# Patient Record
Sex: FEMALE | Race: BLACK OR AFRICAN AMERICAN | Employment: FULL TIME | URBAN - METROPOLITAN AREA
[De-identification: names, ages, dates, MRNs, and addresses within clinical notes are randomized per-mention and may not be internally consistent; named-entity substitution may affect disease eponyms.]

---

## 2019-03-02 ENCOUNTER — ED HISTORICAL/CONVERTED ENCOUNTER (OUTPATIENT)
Dept: OTHER | Age: 44
End: 2019-03-02

## 2019-08-02 ENCOUNTER — HOSPITAL ENCOUNTER (OUTPATIENT)
Dept: NON INVASIVE DIAGNOSTICS | Age: 44
Discharge: HOME | End: 2019-08-02
Attending: ANESTHESIOLOGY
Payer: COMMERCIAL

## 2019-08-02 ENCOUNTER — HOSPITAL ENCOUNTER (OUTPATIENT)
Dept: PREADMISSION TESTING | Age: 44
Discharge: HOME | End: 2019-08-02
Payer: COMMERCIAL

## 2019-08-02 VITALS
SYSTOLIC BLOOD PRESSURE: 135 MMHG | HEART RATE: 80 BPM | WEIGHT: 242.5 LBS | RESPIRATION RATE: 16 BRPM | DIASTOLIC BLOOD PRESSURE: 84 MMHG | OXYGEN SATURATION: 98 % | HEIGHT: 64 IN | BODY MASS INDEX: 41.4 KG/M2 | TEMPERATURE: 98.8 F

## 2019-08-02 LAB
ABO + RH BLD: NORMAL
ANION GAP SERPL CALC-SCNC: 4 MMOL/L (ref 5–15)
BASOPHILS # BLD: 0.1 K/UL (ref 0–0.1)
BASOPHILS NFR BLD: 1 % (ref 0–1)
BLOOD GROUP ANTIBODIES SERPL: NORMAL
BUN SERPL-MCNC: 12 MG/DL (ref 6–20)
BUN/CREAT SERPL: 15 (ref 12–20)
CALCIUM SERPL-MCNC: 9 MG/DL (ref 8.5–10.1)
CHLORIDE SERPL-SCNC: 105 MMOL/L (ref 97–108)
CO2 SERPL-SCNC: 30 MMOL/L (ref 21–32)
CREAT SERPL-MCNC: 0.82 MG/DL (ref 0.55–1.02)
DIFFERENTIAL METHOD BLD: ABNORMAL
EOSINOPHIL # BLD: 0.1 K/UL (ref 0–0.4)
EOSINOPHIL NFR BLD: 1 % (ref 0–7)
ERYTHROCYTE [DISTWIDTH] IN BLOOD BY AUTOMATED COUNT: 23.3 % (ref 11.5–14.5)
GLUCOSE SERPL-MCNC: 87 MG/DL (ref 65–100)
HCT VFR BLD AUTO: 32.7 % (ref 35–47)
HGB BLD-MCNC: 9.8 G/DL (ref 11.5–16)
IMM GRANULOCYTES # BLD AUTO: 0 K/UL (ref 0–0.04)
IMM GRANULOCYTES NFR BLD AUTO: 0 % (ref 0–0.5)
LYMPHOCYTES # BLD: 2.4 K/UL (ref 0.8–3.5)
LYMPHOCYTES NFR BLD: 36 % (ref 12–49)
MCH RBC QN AUTO: 26.8 PG (ref 26–34)
MCHC RBC AUTO-ENTMCNC: 30 G/DL (ref 30–36.5)
MCV RBC AUTO: 89.3 FL (ref 80–99)
MONOCYTES # BLD: 0.4 K/UL (ref 0–1)
MONOCYTES NFR BLD: 6 % (ref 5–13)
NEUTS SEG # BLD: 3.8 K/UL (ref 1.8–8)
NEUTS SEG NFR BLD: 56 % (ref 32–75)
NRBC # BLD: 0 K/UL (ref 0–0.01)
NRBC BLD-RTO: 0 PER 100 WBC
PLATELET # BLD AUTO: 311 K/UL (ref 150–400)
PMV BLD AUTO: 10.3 FL (ref 8.9–12.9)
POTASSIUM SERPL-SCNC: 3.9 MMOL/L (ref 3.5–5.1)
RBC # BLD AUTO: 3.66 M/UL (ref 3.8–5.2)
RBC MORPH BLD: ABNORMAL
SODIUM SERPL-SCNC: 139 MMOL/L (ref 136–145)
SPECIMEN EXP DATE BLD: NORMAL
WBC # BLD AUTO: 6.8 K/UL (ref 3.6–11)

## 2019-08-02 PROCEDURE — 36415 COLL VENOUS BLD VENIPUNCTURE: CPT

## 2019-08-02 PROCEDURE — 85025 COMPLETE CBC W/AUTO DIFF WBC: CPT

## 2019-08-02 PROCEDURE — 80048 BASIC METABOLIC PNL TOTAL CA: CPT

## 2019-08-02 PROCEDURE — 93005 ELECTROCARDIOGRAM TRACING: CPT

## 2019-08-02 PROCEDURE — 86900 BLOOD TYPING SEROLOGIC ABO: CPT

## 2019-08-02 RX ORDER — PAROXETINE HYDROCHLORIDE 20 MG/1
10 TABLET, FILM COATED ORAL DAILY
Status: ON HOLD | COMMUNITY
End: 2019-09-01

## 2019-08-02 RX ORDER — VALACYCLOVIR HYDROCHLORIDE 1 G/1
1000 TABLET, FILM COATED ORAL DAILY
Status: ON HOLD | COMMUNITY
End: 2019-09-01

## 2019-08-02 RX ORDER — ATORVASTATIN CALCIUM 10 MG/1
10 TABLET, FILM COATED ORAL DAILY
COMMUNITY
End: 2020-09-29

## 2019-08-02 RX ORDER — LISINOPRIL AND HYDROCHLOROTHIAZIDE 10; 12.5 MG/1; MG/1
1 TABLET ORAL DAILY
COMMUNITY
End: 2020-09-29

## 2019-08-02 NOTE — PERIOP NOTES
[address] [address] [facility]   MAIN OR                                  [phone]   MAIN PRE OP                          [phone]                                                                                AMBULATORY PRE OP          [phone]  PRE-ADMISSION TESTING    [phone]     Surgery Date:   Friday, August 23, 2019        Is surgery arrival time given by surgeon? NO  If NO, [place] staff will call you between 3 and 7pm the day before your surgery with your arrival time. (If your surgery is on a Monday, we will call you the Friday before.)    Call [phone] after 7pm Monday-Friday if you did not receive your arrival time. INSTRUCTIONS BEFORE YOUR SURGERY   When You  Arrive Arrive at the [address] [facility] on the day of your surgery  Have your insurance card, photo ID, and any copayment (if needed)   Food   and   Drink NO food or drink after midnight the night before surgery    This means NO water, gum, mints, coffee, juice, etc.  No alcohol (beer, wine, liquor) 24 hours before and after surgery   Medications to   TAKE   Morning of Surgery MEDICATIONS TO TAKE THE MORNING OF SURGERY WITH A SIP OF WATER:    Paxil   Medications  To  STOP      7 days before surgery  Non-Steroidal anti-inflammatory Drugs (NSAID's): for example, Ibuprofen (Advil, Motrin), Naproxen (Aleve)   Aspirin, if taking for pain    Herbal supplements, vitamins, and fish oil   Other:  (Pain medications not listed above, including Tylenol may be taken)   Blood  Thinners  If you take  Aspirin, Plavix, Coumadin, or any blood-thinning or anti-blood clot medicine, talk to the doctor who prescribed the medications for pre-operative instructions.    Bathing Clothing  Jewelry  Valuables       If you shower the morning of surgery, please do not apply anything to your skin (lotions, powders, deodorant, or makeup, especially mascara)   Follow all special bath instructions (for total joint replacement, spine and bowel surgeries)   Do not shave or trim anywhere 24 hours before surgery   Wear your hair loose or down; no pony-tails, buns, or metal hair clips   Wear loose, comfortable, clean clothes   Wear glasses instead of contacts   Leave money, valuables, and jewelry, including body piercings, at home   Going Home - or Spending the Night  SAME-DAY SURGERY: You must have a responsible adult drive you home and stay with you 24 hours after surgery   ADMITS: If your doctor is keeping you in the hospital after surgery, leave personal belongings/luggage in your car until you have a hospital room number. Hospital discharge time is 12 noon  Drivers must be here before 12 noon unless you are told differently   Special Instructions Please bring completed medication sheet and CPAP machine the day of surgery. Free  parking available from 7am until 5pm.     Follow all instructions so your surgery wont be cancelled. Please, be on time. If a situation occurs and you are delayed the day of surgery, call [phone]. If your physical condition changes (like a fever, cold, flu, etc.) call your surgeon. The patient was contacted  in person. Home medication reviewed and verified during PAT appointment. The patient verbalizes understanding of all instructions and does not  need reinforcement.

## 2019-08-03 LAB
ATRIAL RATE: 72 BPM
BACTERIA SPEC CULT: ABNORMAL
BACTERIA SPEC CULT: ABNORMAL
CALCULATED P AXIS, ECG09: 31 DEGREES
CALCULATED R AXIS, ECG10: 19 DEGREES
CALCULATED T AXIS, ECG11: 30 DEGREES
DIAGNOSIS, 93000: NORMAL
P-R INTERVAL, ECG05: 172 MS
Q-T INTERVAL, ECG07: 402 MS
QRS DURATION, ECG06: 86 MS
QTC CALCULATION (BEZET), ECG08: 440 MS
SERVICE CMNT-IMP: ABNORMAL
VENTRICULAR RATE, ECG03: 72 BPM

## 2019-08-20 NOTE — PERIOP NOTES
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 Order for contact isolation placed in [facility] under sign and held, multi-phase orders for the day of surgery.   DOS: 8/23/2019

## 2019-08-22 ENCOUNTER — ANESTHESIA EVENT (OUTPATIENT)
Dept: SURGERY | Age: 44
DRG: 743 | End: 2019-08-22
Payer: COMMERCIAL

## 2019-08-23 ENCOUNTER — HOSPITAL ENCOUNTER (INPATIENT)
Age: 44
LOS: 2 days | Discharge: HOME | DRG: 743 | End: 2019-08-25
Attending: OBSTETRICS & GYNECOLOGY | Admitting: OBSTETRICS & GYNECOLOGY
Payer: COMMERCIAL

## 2019-08-23 ENCOUNTER — ANESTHESIA (OUTPATIENT)
Dept: SURGERY | Age: 44
DRG: 743 | End: 2019-08-23
Payer: COMMERCIAL

## 2019-08-23 ENCOUNTER — APPOINTMENT (OUTPATIENT)
Dept: GENERAL RADIOLOGY | Age: 44
DRG: 743 | End: 2019-08-23
Attending: OBSTETRICS & GYNECOLOGY
Payer: COMMERCIAL

## 2019-08-23 DIAGNOSIS — G89.18 POSTOPERATIVE PAIN: Primary | ICD-10-CM

## 2019-08-23 PROBLEM — N93.9 ABNORMAL UTERINE BLEEDING: Status: ACTIVE | Noted: 2019-08-23

## 2019-08-23 LAB
ABO + RH BLD: NORMAL
BLOOD GROUP ANTIBODIES SERPL: NORMAL
HCG UR QL: NEGATIVE
SPECIMEN EXP DATE BLD: NORMAL

## 2019-08-23 PROCEDURE — 77030031139 HC SUT VCRL2 J&J -A: Performed by: OBSTETRICS & GYNECOLOGY

## 2019-08-23 PROCEDURE — 74011250636 HC RX REV CODE- 250/636

## 2019-08-23 PROCEDURE — 77030026438 HC STYL ET INTUB CARD -A: Performed by: NURSE ANESTHETIST, CERTIFIED REGISTERED

## 2019-08-23 PROCEDURE — 77030008684 HC TU ET CUF COVD -B: Performed by: NURSE ANESTHETIST, CERTIFIED REGISTERED

## 2019-08-23 PROCEDURE — 77030002933 HC SUT MCRYL J&J -A: Performed by: OBSTETRICS & GYNECOLOGY

## 2019-08-23 PROCEDURE — 74011250636 HC RX REV CODE- 250/636: Performed by: OBSTETRICS & GYNECOLOGY

## 2019-08-23 PROCEDURE — 74011250637 HC RX REV CODE- 250/637: Performed by: NURSE ANESTHETIST, CERTIFIED REGISTERED

## 2019-08-23 PROCEDURE — 77030027744 HC PWDR HEMSTAT ARISTA ABSRB 5GM BARD -D: Performed by: OBSTETRICS & GYNECOLOGY

## 2019-08-23 PROCEDURE — 74011250636 HC RX REV CODE- 250/636: Performed by: ANESTHESIOLOGY

## 2019-08-23 PROCEDURE — 76210000000 HC OR PH I REC 2 TO 2.5 HR: Performed by: OBSTETRICS & GYNECOLOGY

## 2019-08-23 PROCEDURE — 77030013079 HC BLNKT BAIR HGGR 3M -A: Performed by: NURSE ANESTHETIST, CERTIFIED REGISTERED

## 2019-08-23 PROCEDURE — 74011000250 HC RX REV CODE- 250: Performed by: NURSE ANESTHETIST, CERTIFIED REGISTERED

## 2019-08-23 PROCEDURE — 74011250636 HC RX REV CODE- 250/636: Performed by: NURSE ANESTHETIST, CERTIFIED REGISTERED

## 2019-08-23 PROCEDURE — 0UT70ZZ RESECTION OF BILATERAL FALLOPIAN TUBES, OPEN APPROACH: ICD-10-PCS | Performed by: OBSTETRICS & GYNECOLOGY

## 2019-08-23 PROCEDURE — 77030002966 HC SUT PDS J&J -A: Performed by: OBSTETRICS & GYNECOLOGY

## 2019-08-23 PROCEDURE — 74011250637 HC RX REV CODE- 250/637: Performed by: OBSTETRICS & GYNECOLOGY

## 2019-08-23 PROCEDURE — 77030002974 HC SUT PLN J&J -A: Performed by: OBSTETRICS & GYNECOLOGY

## 2019-08-23 PROCEDURE — 77030002888 HC SUT CHRMC J&J -A: Performed by: OBSTETRICS & GYNECOLOGY

## 2019-08-23 PROCEDURE — 77030003029 HC SUT VCRL J&J -B: Performed by: OBSTETRICS & GYNECOLOGY

## 2019-08-23 PROCEDURE — 0UT90ZZ RESECTION OF UTERUS, OPEN APPROACH: ICD-10-PCS | Performed by: OBSTETRICS & GYNECOLOGY

## 2019-08-23 PROCEDURE — 77030034850: Performed by: OBSTETRICS & GYNECOLOGY

## 2019-08-23 PROCEDURE — 77030040361 HC SLV COMPR DVT MDII -B

## 2019-08-23 PROCEDURE — 76010000134 HC OR TIME 3.5 TO 4 HR: Performed by: OBSTETRICS & GYNECOLOGY

## 2019-08-23 PROCEDURE — 74018 RADEX ABDOMEN 1 VIEW: CPT

## 2019-08-23 PROCEDURE — 86900 BLOOD TYPING SEROLOGIC ABO: CPT

## 2019-08-23 PROCEDURE — 77030018846 HC SOL IRR STRL H20 ICUM -A

## 2019-08-23 PROCEDURE — C9290 INJ, BUPIVACAINE LIPOSOME: HCPCS | Performed by: OBSTETRICS & GYNECOLOGY

## 2019-08-23 PROCEDURE — 76060000038 HC ANESTHESIA 3.5 TO 4 HR: Performed by: OBSTETRICS & GYNECOLOGY

## 2019-08-23 PROCEDURE — 77030020782 HC GWN BAIR PAWS FLX 3M -B

## 2019-08-23 PROCEDURE — 77030008467 HC STPLR SKN COVD -B: Performed by: OBSTETRICS & GYNECOLOGY

## 2019-08-23 PROCEDURE — 77030018836 HC SOL IRR NACL ICUM -A: Performed by: OBSTETRICS & GYNECOLOGY

## 2019-08-23 PROCEDURE — 81025 URINE PREGNANCY TEST: CPT

## 2019-08-23 PROCEDURE — 77030011640 HC PAD GRND REM COVD -A: Performed by: OBSTETRICS & GYNECOLOGY

## 2019-08-23 PROCEDURE — 74011000250 HC RX REV CODE- 250: Performed by: OBSTETRICS & GYNECOLOGY

## 2019-08-23 PROCEDURE — 88307 TISSUE EXAM BY PATHOLOGIST: CPT

## 2019-08-23 PROCEDURE — 36415 COLL VENOUS BLD VENIPUNCTURE: CPT

## 2019-08-23 PROCEDURE — 99218 HC RM OBSERVATION: CPT

## 2019-08-23 PROCEDURE — 77030019908 HC STETH ESOPH SIMS -A: Performed by: NURSE ANESTHETIST, CERTIFIED REGISTERED

## 2019-08-23 PROCEDURE — 77030018723 HC ELCTRD BLD COVD -A: Performed by: OBSTETRICS & GYNECOLOGY

## 2019-08-23 PROCEDURE — 77030008771 HC TU NG SALEM SUMP -A: Performed by: NURSE ANESTHETIST, CERTIFIED REGISTERED

## 2019-08-23 PROCEDURE — 74011000258 HC RX REV CODE- 258: Performed by: OBSTETRICS & GYNECOLOGY

## 2019-08-23 PROCEDURE — 77030018809 HC RETRCTR ALXSO DISP AMR -B: Performed by: OBSTETRICS & GYNECOLOGY

## 2019-08-23 RX ORDER — CEFAZOLIN SODIUM 1 G/3ML
INJECTION, POWDER, FOR SOLUTION INTRAMUSCULAR; INTRAVENOUS AS NEEDED
Status: DISCONTINUED | OUTPATIENT
Start: 2019-08-23 | End: 2019-08-23

## 2019-08-23 RX ORDER — OXYCODONE AND ACETAMINOPHEN 5; 325 MG/1; MG/1
2 TABLET ORAL
Status: DISCONTINUED | OUTPATIENT
Start: 2019-08-23 | End: 2019-08-25 | Stop reason: HOSPADM

## 2019-08-23 RX ORDER — LIDOCAINE HYDROCHLORIDE 10 MG/ML
0.1 INJECTION, SOLUTION EPIDURAL; INFILTRATION; INTRACAUDAL; PERINEURAL AS NEEDED
Status: DISCONTINUED | OUTPATIENT
Start: 2019-08-23 | End: 2019-08-23 | Stop reason: HOSPADM

## 2019-08-23 RX ORDER — ONDANSETRON 2 MG/ML
4 INJECTION INTRAMUSCULAR; INTRAVENOUS
Status: DISCONTINUED | OUTPATIENT
Start: 2019-08-23 | End: 2019-08-25 | Stop reason: HOSPADM

## 2019-08-23 RX ORDER — NALOXONE HYDROCHLORIDE 0.4 MG/ML
INJECTION, SOLUTION INTRAMUSCULAR; INTRAVENOUS; SUBCUTANEOUS AS NEEDED
Status: DISCONTINUED | OUTPATIENT
Start: 2019-08-23 | End: 2019-08-23 | Stop reason: HOSPADM

## 2019-08-23 RX ORDER — NALOXONE HYDROCHLORIDE 0.4 MG/ML
0.4 INJECTION, SOLUTION INTRAMUSCULAR; INTRAVENOUS; SUBCUTANEOUS AS NEEDED
Status: DISCONTINUED | OUTPATIENT
Start: 2019-08-23 | End: 2019-08-25 | Stop reason: HOSPADM

## 2019-08-23 RX ORDER — NALOXONE HYDROCHLORIDE 0.4 MG/ML
0.2 INJECTION, SOLUTION INTRAMUSCULAR; INTRAVENOUS; SUBCUTANEOUS
Status: DISCONTINUED | OUTPATIENT
Start: 2019-08-23 | End: 2019-08-23 | Stop reason: HOSPADM

## 2019-08-23 RX ORDER — ONDANSETRON 2 MG/ML
INJECTION INTRAMUSCULAR; INTRAVENOUS AS NEEDED
Status: DISCONTINUED | OUTPATIENT
Start: 2019-08-23 | End: 2019-08-23 | Stop reason: HOSPADM

## 2019-08-23 RX ORDER — SODIUM CHLORIDE, SODIUM LACTATE, POTASSIUM CHLORIDE, CALCIUM CHLORIDE 600; 310; 30; 20 MG/100ML; MG/100ML; MG/100ML; MG/100ML
125 INJECTION, SOLUTION INTRAVENOUS CONTINUOUS
Status: DISCONTINUED | OUTPATIENT
Start: 2019-08-23 | End: 2019-08-23 | Stop reason: HOSPADM

## 2019-08-23 RX ORDER — ROCURONIUM BROMIDE 10 MG/ML
INJECTION, SOLUTION INTRAVENOUS AS NEEDED
Status: DISCONTINUED | OUTPATIENT
Start: 2019-08-23 | End: 2019-08-23 | Stop reason: HOSPADM

## 2019-08-23 RX ORDER — NEOSTIGMINE METHYLSULFATE 1 MG/ML
INJECTION INTRAVENOUS AS NEEDED
Status: DISCONTINUED | OUTPATIENT
Start: 2019-08-23 | End: 2019-08-23 | Stop reason: HOSPADM

## 2019-08-23 RX ORDER — MIDAZOLAM HYDROCHLORIDE 1 MG/ML
INJECTION, SOLUTION INTRAMUSCULAR; INTRAVENOUS AS NEEDED
Status: DISCONTINUED | OUTPATIENT
Start: 2019-08-23 | End: 2019-08-23 | Stop reason: HOSPADM

## 2019-08-23 RX ORDER — OXYCODONE AND ACETAMINOPHEN 5; 325 MG/1; MG/1
1 TABLET ORAL
Status: DISCONTINUED | OUTPATIENT
Start: 2019-08-23 | End: 2019-08-25 | Stop reason: HOSPADM

## 2019-08-23 RX ORDER — CEFAZOLIN SODIUM/WATER 2 G/20 ML
2 SYRINGE (ML) INTRAVENOUS
Status: COMPLETED | OUTPATIENT
Start: 2019-08-23 | End: 2019-08-23

## 2019-08-23 RX ORDER — DOCUSATE SODIUM 100 MG/1
100 CAPSULE, LIQUID FILLED ORAL 2 TIMES DAILY
Status: DISCONTINUED | OUTPATIENT
Start: 2019-08-23 | End: 2019-08-25 | Stop reason: HOSPADM

## 2019-08-23 RX ORDER — EPHEDRINE SULFATE/0.9% NACL/PF 50 MG/5 ML
SYRINGE (ML) INTRAVENOUS AS NEEDED
Status: DISCONTINUED | OUTPATIENT
Start: 2019-08-23 | End: 2019-08-23 | Stop reason: HOSPADM

## 2019-08-23 RX ORDER — GLYCOPYRROLATE 0.2 MG/ML
INJECTION INTRAMUSCULAR; INTRAVENOUS AS NEEDED
Status: DISCONTINUED | OUTPATIENT
Start: 2019-08-23 | End: 2019-08-23 | Stop reason: HOSPADM

## 2019-08-23 RX ORDER — ALBUTEROL SULFATE 90 UG/1
AEROSOL, METERED RESPIRATORY (INHALATION) AS NEEDED
Status: DISCONTINUED | OUTPATIENT
Start: 2019-08-23 | End: 2019-08-23 | Stop reason: HOSPADM

## 2019-08-23 RX ORDER — LIDOCAINE HYDROCHLORIDE 20 MG/ML
INJECTION, SOLUTION EPIDURAL; INFILTRATION; INTRACAUDAL; PERINEURAL AS NEEDED
Status: DISCONTINUED | OUTPATIENT
Start: 2019-08-23 | End: 2019-08-23 | Stop reason: HOSPADM

## 2019-08-23 RX ORDER — HYDROMORPHONE HYDROCHLORIDE 2 MG/ML
INJECTION, SOLUTION INTRAMUSCULAR; INTRAVENOUS; SUBCUTANEOUS AS NEEDED
Status: DISCONTINUED | OUTPATIENT
Start: 2019-08-23 | End: 2019-08-23 | Stop reason: HOSPADM

## 2019-08-23 RX ORDER — HYDROMORPHONE HYDROCHLORIDE 2 MG/ML
1 INJECTION, SOLUTION INTRAMUSCULAR; INTRAVENOUS; SUBCUTANEOUS
Status: DISCONTINUED | OUTPATIENT
Start: 2019-08-23 | End: 2019-08-25 | Stop reason: HOSPADM

## 2019-08-23 RX ORDER — KETOROLAC TROMETHAMINE 30 MG/ML
INJECTION, SOLUTION INTRAMUSCULAR; INTRAVENOUS AS NEEDED
Status: DISCONTINUED | OUTPATIENT
Start: 2019-08-23 | End: 2019-08-23 | Stop reason: HOSPADM

## 2019-08-23 RX ORDER — DIPHENHYDRAMINE HCL 25 MG
25 CAPSULE ORAL
Status: DISCONTINUED | OUTPATIENT
Start: 2019-08-23 | End: 2019-08-25 | Stop reason: HOSPADM

## 2019-08-23 RX ORDER — FENTANYL CITRATE 50 UG/ML
INJECTION, SOLUTION INTRAMUSCULAR; INTRAVENOUS AS NEEDED
Status: DISCONTINUED | OUTPATIENT
Start: 2019-08-23 | End: 2019-08-23 | Stop reason: HOSPADM

## 2019-08-23 RX ORDER — SODIUM CHLORIDE 0.9 % (FLUSH) 0.9 %
5-40 SYRINGE (ML) INJECTION AS NEEDED
Status: DISCONTINUED | OUTPATIENT
Start: 2019-08-23 | End: 2019-08-25 | Stop reason: HOSPADM

## 2019-08-23 RX ORDER — DIPHENHYDRAMINE HYDROCHLORIDE 50 MG/ML
12.5 INJECTION, SOLUTION INTRAMUSCULAR; INTRAVENOUS AS NEEDED
Status: DISCONTINUED | OUTPATIENT
Start: 2019-08-23 | End: 2019-08-23 | Stop reason: HOSPADM

## 2019-08-23 RX ORDER — SUCCINYLCHOLINE CHLORIDE 20 MG/ML
INJECTION INTRAMUSCULAR; INTRAVENOUS AS NEEDED
Status: DISCONTINUED | OUTPATIENT
Start: 2019-08-23 | End: 2019-08-23 | Stop reason: HOSPADM

## 2019-08-23 RX ORDER — FLUMAZENIL 0.1 MG/ML
0.2 INJECTION INTRAVENOUS
Status: DISCONTINUED | OUTPATIENT
Start: 2019-08-23 | End: 2019-08-23 | Stop reason: HOSPADM

## 2019-08-23 RX ORDER — HYDROMORPHONE HYDROCHLORIDE 1 MG/ML
.25-1 INJECTION, SOLUTION INTRAMUSCULAR; INTRAVENOUS; SUBCUTANEOUS
Status: DISCONTINUED | OUTPATIENT
Start: 2019-08-23 | End: 2019-08-23 | Stop reason: HOSPADM

## 2019-08-23 RX ORDER — SODIUM CHLORIDE 0.9 % (FLUSH) 0.9 %
5-40 SYRINGE (ML) INJECTION EVERY 8 HOURS
Status: DISCONTINUED | OUTPATIENT
Start: 2019-08-23 | End: 2019-08-25 | Stop reason: HOSPADM

## 2019-08-23 RX ORDER — KETOROLAC TROMETHAMINE 30 MG/ML
30 INJECTION, SOLUTION INTRAMUSCULAR; INTRAVENOUS
Status: DISPENSED | OUTPATIENT
Start: 2019-08-23 | End: 2019-08-24

## 2019-08-23 RX ORDER — DEXAMETHASONE SODIUM PHOSPHATE 4 MG/ML
INJECTION, SOLUTION INTRA-ARTICULAR; INTRALESIONAL; INTRAMUSCULAR; INTRAVENOUS; SOFT TISSUE AS NEEDED
Status: DISCONTINUED | OUTPATIENT
Start: 2019-08-23 | End: 2019-08-23 | Stop reason: HOSPADM

## 2019-08-23 RX ORDER — DEXTROSE MONOHYDRATE AND SODIUM CHLORIDE 5; .9 G/100ML; G/100ML
125 INJECTION, SOLUTION INTRAVENOUS CONTINUOUS
Status: DISPENSED | OUTPATIENT
Start: 2019-08-23 | End: 2019-08-24

## 2019-08-23 RX ORDER — PROPOFOL 10 MG/ML
INJECTION, EMULSION INTRAVENOUS AS NEEDED
Status: DISCONTINUED | OUTPATIENT
Start: 2019-08-23 | End: 2019-08-23 | Stop reason: HOSPADM

## 2019-08-23 RX ADMIN — FENTANYL CITRATE 100 MCG: 50 INJECTION, SOLUTION INTRAMUSCULAR; INTRAVENOUS at 07:45

## 2019-08-23 RX ADMIN — NALOXONE HYDROCHLORIDE 0.04 MG: 0.4 INJECTION, SOLUTION INTRAMUSCULAR; INTRAVENOUS; SUBCUTANEOUS at 11:05

## 2019-08-23 RX ADMIN — ROCURONIUM BROMIDE 20 MG: 50 INJECTION, SOLUTION INTRAVENOUS at 07:58

## 2019-08-23 RX ADMIN — OXYCODONE HYDROCHLORIDE AND ACETAMINOPHEN 2 TABLET: 5; 325 TABLET ORAL at 16:24

## 2019-08-23 RX ADMIN — ROCURONIUM BROMIDE 25 MG: 50 INJECTION, SOLUTION INTRAVENOUS at 07:45

## 2019-08-23 RX ADMIN — GLYCOPYRROLATE 0.5 MG: 0.2 INJECTION, SOLUTION INTRAMUSCULAR; INTRAVENOUS at 09:53

## 2019-08-23 RX ADMIN — DEXTROSE MONOHYDRATE AND SODIUM CHLORIDE 125 ML/HR: 5; .9 INJECTION, SOLUTION INTRAVENOUS at 11:25

## 2019-08-23 RX ADMIN — HYDROMORPHONE HYDROCHLORIDE 0.5 MG: 2 INJECTION INTRAMUSCULAR; INTRAVENOUS; SUBCUTANEOUS at 09:33

## 2019-08-23 RX ADMIN — LIDOCAINE HYDROCHLORIDE 80 MG: 20 INJECTION, SOLUTION INTRAVENOUS at 07:41

## 2019-08-23 RX ADMIN — DEXTROSE MONOHYDRATE AND SODIUM CHLORIDE 125 ML/HR: 5; .9 INJECTION, SOLUTION INTRAVENOUS at 19:25

## 2019-08-23 RX ADMIN — HYDROMORPHONE HYDROCHLORIDE 0.5 MG: 2 INJECTION INTRAMUSCULAR; INTRAVENOUS; SUBCUTANEOUS at 10:07

## 2019-08-23 RX ADMIN — SUCCINYLCHOLINE CHLORIDE 140 MG: 20 INJECTION, SOLUTION INTRAMUSCULAR; INTRAVENOUS; PARENTERAL at 07:41

## 2019-08-23 RX ADMIN — Medication 10 MG: at 09:27

## 2019-08-23 RX ADMIN — FENTANYL CITRATE 100 MCG: 50 INJECTION, SOLUTION INTRAMUSCULAR; INTRAVENOUS at 07:41

## 2019-08-23 RX ADMIN — PROPOFOL 60 MG: 10 INJECTION, EMULSION INTRAVENOUS at 09:31

## 2019-08-23 RX ADMIN — PROPOFOL 80 MG: 10 INJECTION, EMULSION INTRAVENOUS at 07:45

## 2019-08-23 RX ADMIN — DOCUSATE SODIUM 100 MG: 100 CAPSULE, LIQUID FILLED ORAL at 18:03

## 2019-08-23 RX ADMIN — ONDANSETRON 4 MG: 2 INJECTION INTRAMUSCULAR; INTRAVENOUS at 09:28

## 2019-08-23 RX ADMIN — GLYCOPYRROLATE 0.1 MG: 0.2 INJECTION, SOLUTION INTRAMUSCULAR; INTRAVENOUS at 08:18

## 2019-08-23 RX ADMIN — PHENYLEPHRINE HYDROCHLORIDE 40 MCG: 10 INJECTION INTRAVENOUS at 08:44

## 2019-08-23 RX ADMIN — SODIUM CHLORIDE, SODIUM LACTATE, POTASSIUM CHLORIDE, AND CALCIUM CHLORIDE: 600; 310; 30; 20 INJECTION, SOLUTION INTRAVENOUS at 10:38

## 2019-08-23 RX ADMIN — ONDANSETRON 4 MG: 2 INJECTION INTRAMUSCULAR; INTRAVENOUS at 22:33

## 2019-08-23 RX ADMIN — PROPOFOL 200 MG: 10 INJECTION, EMULSION INTRAVENOUS at 07:41

## 2019-08-23 RX ADMIN — MIDAZOLAM HYDROCHLORIDE 3 MG: 1 INJECTION, SOLUTION INTRAMUSCULAR; INTRAVENOUS at 07:33

## 2019-08-23 RX ADMIN — ALBUTEROL SULFATE 3 PUFF: 90 AEROSOL, METERED RESPIRATORY (INHALATION) at 11:06

## 2019-08-23 RX ADMIN — HYDROMORPHONE HYDROCHLORIDE 0.5 MG: 1 INJECTION, SOLUTION INTRAMUSCULAR; INTRAVENOUS; SUBCUTANEOUS at 12:24

## 2019-08-23 RX ADMIN — NEOSTIGMINE METHYLSULFATE 3 MG: 1 INJECTION, SOLUTION INTRAVENOUS at 09:53

## 2019-08-23 RX ADMIN — Medication 2 G: at 07:52

## 2019-08-23 RX ADMIN — PHENYLEPHRINE HYDROCHLORIDE 40 MCG: 10 INJECTION INTRAVENOUS at 09:18

## 2019-08-23 RX ADMIN — KETOROLAC TROMETHAMINE 30 MG: 30 INJECTION, SOLUTION INTRAMUSCULAR at 22:32

## 2019-08-23 RX ADMIN — Medication 10 MG: at 08:20

## 2019-08-23 RX ADMIN — ROCURONIUM BROMIDE 5 MG: 50 INJECTION, SOLUTION INTRAVENOUS at 07:41

## 2019-08-23 RX ADMIN — DEXAMETHASONE SODIUM PHOSPHATE 8 MG: 4 INJECTION, SOLUTION INTRAMUSCULAR; INTRAVENOUS at 08:18

## 2019-08-23 RX ADMIN — FENTANYL CITRATE 50 MCG: 50 INJECTION, SOLUTION INTRAMUSCULAR; INTRAVENOUS at 09:05

## 2019-08-23 RX ADMIN — KETOROLAC TROMETHAMINE 30 MG: 30 INJECTION INTRAMUSCULAR; INTRAVENOUS at 09:46

## 2019-08-23 RX ADMIN — OXYCODONE HYDROCHLORIDE AND ACETAMINOPHEN 2 TABLET: 5; 325 TABLET ORAL at 22:32

## 2019-08-23 RX ADMIN — Medication 10 ML: at 22:33

## 2019-08-23 RX ADMIN — NALOXONE HYDROCHLORIDE 0.04 MG: 0.4 INJECTION, SOLUTION INTRAMUSCULAR; INTRAVENOUS; SUBCUTANEOUS at 11:00

## 2019-08-23 RX ADMIN — SODIUM CHLORIDE, SODIUM LACTATE, POTASSIUM CHLORIDE, AND CALCIUM CHLORIDE: 600; 310; 30; 20 INJECTION, SOLUTION INTRAVENOUS at 08:29

## 2019-08-23 RX ADMIN — SUGAMMADEX 100 MG: 100 INJECTION, SOLUTION INTRAVENOUS at 11:06

## 2019-08-23 RX ADMIN — PROPOFOL 60 MG: 10 INJECTION, EMULSION INTRAVENOUS at 10:54

## 2019-08-23 RX ADMIN — FENTANYL CITRATE 50 MCG: 50 INJECTION, SOLUTION INTRAMUSCULAR; INTRAVENOUS at 08:38

## 2019-08-23 RX ADMIN — SODIUM CHLORIDE, SODIUM LACTATE, POTASSIUM CHLORIDE, AND CALCIUM CHLORIDE 125 ML/HR: 600; 310; 30; 20 INJECTION, SOLUTION INTRAVENOUS at 06:51

## 2019-08-23 RX ADMIN — SUGAMMADEX 300 MG: 100 INJECTION, SOLUTION INTRAVENOUS at 10:59

## 2019-08-23 RX ADMIN — HYDROMORPHONE HYDROCHLORIDE 0.5 MG: 1 INJECTION, SOLUTION INTRAMUSCULAR; INTRAVENOUS; SUBCUTANEOUS at 11:43

## 2019-08-23 RX ADMIN — ROCURONIUM BROMIDE 15 MG: 50 INJECTION, SOLUTION INTRAVENOUS at 08:37

## 2019-08-23 RX ADMIN — HYDROMORPHONE HYDROCHLORIDE 0.5 MG: 1 INJECTION, SOLUTION INTRAMUSCULAR; INTRAVENOUS; SUBCUTANEOUS at 11:29

## 2019-08-23 NOTE — PROGRESS NOTES
TRANSFER - IN REPORT:    Verbal report received from [facility]. RN(name) on Kevin Matter  being received from PACU(unit) for routine progression of care      Report consisted of patients Situation, Background, Assessment and   Recommendations(SBAR). Information from the following report(s) SBAR, Kardex, [facility] and Recent Results was reviewed with the receiving nurse. Opportunity for questions and clarification was provided. Assessment completed upon patients arrival to unit and care assumed. Patient requesting heating pad. On call MD paged. CPAP set up    Bedside and Verbal shift change report given to [employer] (oncoming nurse) by ADELAIDE Karimi RN (offgoing nurse). Report included the following information SBAR, Kardex, MAR and Recent Results.

## 2019-08-23 NOTE — BRIEF OP NOTE
BRIEF OPERATIVE NOTE    Date of Procedure: 8/23/2019   Preoperative Diagnosis: ABNORMAL UTERINE BLEEDING  ANEMIA   FIBROIDS  Postoperative Diagnosis: ABNORMAL UTERINE BLEEDING  ANEMIA   FIBROIDS    Procedure(s):  TOTAL ABDOMINAL HYSTERECTOMY (APRIL) WITH BILATERAL SALPINGECTOMY  Surgeon(s) and Role:     * Christy Norton MD - Primary         Surgical Assistant: Eliezer Mtz DO     Surgical Staff:  Circ-1: Ildefonso Garrett RN  Circ-Relief: Jerry Lerma RN  Scrub Tech-1: Vivien Rojas  Float Staff: Arthur Liu RN  Event Time In Time Out   Incision Start 4730    Incision Close       Anesthesia: General   Estimated Blood Loss: 350ml   Specimens:   ID Type Source Tests Collected by Time Destination   1 : Uterus, cervix and bilateral fallopian tubes Preservative Uterus  Ray Hayward MD 8/23/2019 1932 Pathology      Findings: Enlarged fibroid uterus, normal ovaries.   Prior BTL    Complications: None   Implants: * No implants in log *    Rafy Jin MD  [facility]

## 2019-08-23 NOTE — PERIOP NOTES
TRANSFER - OUT REPORT via phone    Verbal report given to Juan Carlos Jaime RN on Lesly Schlatter  being transferred to [facility] for routine post - op       Report consisted of patients Situation, Background, Assessment and   Recommendations(SBAR). Information from the following report(s) SBAR, OR Summary, Intake/Output and MAR was reviewed with the receiving nurse. Lines:   Peripheral IV 08/23/19 Right Hand (Active)   Site Assessment Clean, dry, & intact 8/23/2019 12:45 PM   Phlebitis Assessment 0 8/23/2019 12:45 PM   Infiltration Assessment 0 8/23/2019 12:45 PM   Dressing Status Clean, dry, & intact 8/23/2019 12:45 PM   Dressing Type Transparent;Tape 8/23/2019 12:45 PM   Hub Color/Line Status Patent; Infusing;Pink 8/23/2019 12:45 PM   Action Taken Open ports on tubing capped 8/23/2019  6:50 AM   Alcohol Cap Used Yes 8/23/2019 12:45 PM        Opportunity for questions and clarification was provided.       Patient transported with:   O2 @ 2 liters  Registered Nurse

## 2019-08-23 NOTE — ANESTHESIA PREPROCEDURE EVALUATION
Relevant Problems   No relevant active problems       Anesthetic History   No history of anesthetic complications            Review of Systems / Medical History  Patient summary reviewed, nursing notes reviewed and pertinent labs reviewed    Pulmonary        Sleep apnea: CPAP           Neuro/Psych         Psychiatric history     Cardiovascular    Hypertension          Hyperlipidemia         GI/Hepatic/Renal  Within defined limits              Endo/Other        Morbid obesity     Other Findings            Physical Exam    Airway  Mallampati: II  TM Distance: 4 - 6 cm  Neck ROM: normal range of motion   Mouth opening: Normal     Cardiovascular    Rhythm: regular  Rate: normal         Dental    Dentition: Lower dentition intact and Upper dentition intact     Pulmonary  Breath sounds clear to auscultation               Abdominal         Other Findings            Anesthetic Plan    ASA: 3  Anesthesia type: general          Induction: Intravenous  Anesthetic plan and risks discussed with: Patient

## 2019-08-23 NOTE — ANESTHESIA POSTPROCEDURE EVALUATION
Procedure(s):  TOTAL ABDOMINAL HYSTERECTOMY (APRIL) WITH BILATERAL SALPINGECTOMY. general    Anesthesia Post Evaluation        Patient location during evaluation: PACU  Level of consciousness: awake  Pain management: adequate  Airway patency: patent  Anesthetic complications: no  Cardiovascular status: acceptable  Respiratory status: acceptable  Hydration status: acceptable  Post anesthesia nausea and vomiting:  none      Vitals Value Taken Time   /68 8/23/2019 12:15 PM   Temp 36.8 °C (98.3 °F) 8/23/2019 11:20 AM   Pulse 106 8/23/2019 12:29 PM   Resp 13 8/23/2019 12:29 PM   SpO2 100 % 8/23/2019 12:27 PM   Vitals shown include unvalidated device data.

## 2019-08-23 NOTE — DISCHARGE SUMMARY
Gynecology Discharge Summary     Name: Markell Coreas MRN: [id number]  SSN: [id number]    YOB: 1975  Age: 45 y.o. Sex: female      Allergies: Patient has no known allergies. Admit Date: 8/23/2019    Discharge Date: aug 25    Admitting Physician: Viktoria Medina MD     Discharge Physician: Viktoria Medina MD     * Admission Diagnoses: ABNORMAL UTERINE BLEEDING ANEMIA  FIBROIDS    * Discharge Diagnoses:      * Procedures: Total Abdominal Hysterectomy with Bilateral Salpingectomy    Consults: None    * Discharge Condition: [facility] Course:   Normal hospital course for this procedure. * Discharge Disposition: Home    Discharge Medications:   Current Discharge Medication List           * Follow-up Care/Patient Instructions: Activity: No sex, douching, or tampons for 6 weeks or as directed by your physician. No heavy lifting for 6 weeks. No driving while taking pain medication.   Diet: Resume pre-hospital diet  Wound Care: Keep wound clean and dry and As directed    Follow-up Information    None

## 2019-08-24 LAB
ANION GAP SERPL CALC-SCNC: 7 MMOL/L (ref 5–15)
BACTERIA SPEC CULT: ABNORMAL
BACTERIA SPEC CULT: ABNORMAL
BUN SERPL-MCNC: 8 MG/DL (ref 6–20)
BUN/CREAT SERPL: 10 (ref 12–20)
CALCIUM SERPL-MCNC: 7.8 MG/DL (ref 8.5–10.1)
CHLORIDE SERPL-SCNC: 109 MMOL/L (ref 97–108)
CO2 SERPL-SCNC: 28 MMOL/L (ref 21–32)
CREAT SERPL-MCNC: 0.78 MG/DL (ref 0.55–1.02)
ERYTHROCYTE [DISTWIDTH] IN BLOOD BY AUTOMATED COUNT: 21.7 % (ref 11.5–14.5)
GLUCOSE SERPL-MCNC: 104 MG/DL (ref 65–100)
HCT VFR BLD AUTO: 25.9 % (ref 35–47)
HGB BLD-MCNC: 7.8 G/DL (ref 11.5–16)
MCH RBC QN AUTO: 28.4 PG (ref 26–34)
MCHC RBC AUTO-ENTMCNC: 30.1 G/DL (ref 30–36.5)
MCV RBC AUTO: 94.2 FL (ref 80–99)
NRBC # BLD: 0 K/UL (ref 0–0.01)
NRBC BLD-RTO: 0 PER 100 WBC
PLATELET # BLD AUTO: 228 K/UL (ref 150–400)
PMV BLD AUTO: 10.9 FL (ref 8.9–12.9)
POTASSIUM SERPL-SCNC: 3.7 MMOL/L (ref 3.5–5.1)
RBC # BLD AUTO: 2.75 M/UL (ref 3.8–5.2)
SERVICE CMNT-IMP: ABNORMAL
SODIUM SERPL-SCNC: 144 MMOL/L (ref 136–145)
WBC # BLD AUTO: 11.4 K/UL (ref 3.6–11)

## 2019-08-24 PROCEDURE — 74011250637 HC RX REV CODE- 250/637: Performed by: OBSTETRICS & GYNECOLOGY

## 2019-08-24 PROCEDURE — 51798 US URINE CAPACITY MEASURE: CPT

## 2019-08-24 PROCEDURE — 80048 BASIC METABOLIC PNL TOTAL CA: CPT

## 2019-08-24 PROCEDURE — 99218 HC RM OBSERVATION: CPT

## 2019-08-24 PROCEDURE — 65270000029 HC RM PRIVATE

## 2019-08-24 PROCEDURE — 36415 COLL VENOUS BLD VENIPUNCTURE: CPT

## 2019-08-24 PROCEDURE — 94760 N-INVAS EAR/PLS OXIMETRY 1: CPT

## 2019-08-24 PROCEDURE — 74011000258 HC RX REV CODE- 258: Performed by: OBSTETRICS & GYNECOLOGY

## 2019-08-24 PROCEDURE — 77030018846 HC SOL IRR STRL H20 ICUM -A

## 2019-08-24 PROCEDURE — 77010033678 HC OXYGEN DAILY

## 2019-08-24 PROCEDURE — 85027 COMPLETE CBC AUTOMATED: CPT

## 2019-08-24 RX ADMIN — DOCUSATE SODIUM 100 MG: 100 CAPSULE, LIQUID FILLED ORAL at 08:44

## 2019-08-24 RX ADMIN — OXYCODONE HYDROCHLORIDE AND ACETAMINOPHEN 2 TABLET: 5; 325 TABLET ORAL at 03:06

## 2019-08-24 RX ADMIN — OXYCODONE HYDROCHLORIDE AND ACETAMINOPHEN 2 TABLET: 5; 325 TABLET ORAL at 06:51

## 2019-08-24 RX ADMIN — DEXTROSE MONOHYDRATE AND SODIUM CHLORIDE 125 ML/HR: 5; .9 INJECTION, SOLUTION INTRAVENOUS at 06:33

## 2019-08-24 RX ADMIN — Medication 10 ML: at 22:00

## 2019-08-24 RX ADMIN — OXYCODONE HYDROCHLORIDE AND ACETAMINOPHEN 2 TABLET: 5; 325 TABLET ORAL at 11:36

## 2019-08-24 RX ADMIN — DOCUSATE SODIUM 100 MG: 100 CAPSULE, LIQUID FILLED ORAL at 18:16

## 2019-08-24 RX ADMIN — Medication 10 ML: at 03:07

## 2019-08-24 RX ADMIN — Medication 10 ML: at 18:17

## 2019-08-24 RX ADMIN — OXYCODONE HYDROCHLORIDE AND ACETAMINOPHEN 2 TABLET: 5; 325 TABLET ORAL at 18:16

## 2019-08-24 RX ADMIN — OXYCODONE HYDROCHLORIDE AND ACETAMINOPHEN 2 TABLET: 5; 325 TABLET ORAL at 22:32

## 2019-08-24 NOTE — OP NOTES
[facility] 81  OPERATIVE REPORT    Name:  Ariana Romero  MR#:  [id number]  :  1975  ACCOUNT #:  [de-identified]  DATE OF SERVICE:  2019    PREOPERATIVE DIAGNOSES:  1. Abnormal uterine bleeding. 2.  Anemia. 3.  Fibroid uterus. POSTOPERATIVE DIAGNOSES:  1. Abnormal uterine bleeding. 2.  Anemia. 3.  Fibroid uterus. PROCEDURE PERFORMED:  Total abdominal hysterectomy and bilateral salpingectomy. SURGEON:  Chuyita Nogueira MD    ASSISTANT:  Vishal Barraza DO    ANESTHESIA:  General.    COMPLICATIONS:  None. SPECIMENS REMOVED:  Pathology:  Uterus, cervix, and bilateral fallopian tubes. IMPLANTS:  None. ESTIMATED BLOOD LOSS:  350 mL. IV FLUIDS:  1800 mL. URINE OUTPUT:  250 mL of clear urine at the end of the case. BLOOD PRODUCTS:  None. DRAINS:  Choudhury to gravity. DISPOSITION:  To the recovery room in stable condition. FINDINGS AT THE TIME OF THE PROCEDURE:  1. An enlarged fibroid uterus, normal tubes and ovaries bilaterally. 2.  Evidence of prior tubal ligation. 3.  Hemostasis. PROCEDURE:  After informed consent, the patient was taken to the operating room where general anesthesia was found to be adequate. The patient was then prepped and draped in the normal sterile fashion in dorsal supine low lithotomy position using the Yellofin stirrups. Care was taken to avoid hyperflexion of pressure points. A formal timeout was performed. A Choudhury catheter was placed into the bladder and after a glove change, attention was turned to the abdomen. A Pfannenstiel skin incision was made on the area of repeated  scar. This incision was carried sharply to the underlying layer of fascia. The fascia was incised in the midline and the incision was extended laterally with Obando scissors. The superior aspect of the fascial incision was grasped with two Kocher clamps, elevated and the underlying rectus muscle was dissected sharply.   With blunt dissection, the inferior aspect of the fascial incision was grasped with two Kocher clamps, elevated and the underlying rectus muscle was dissected sharply. The peritoneum was identified and entered sharply. Incision was extended with sharp and blunt dissection. The uterus was elevated and brought through the skin incision. At this point, it was felt that the procedure was able to be proceeded and an Pedro retractor was placed and after ensuring no bowel was entrapped, it was rolled. The cornua of the uterus was grasped with one Ronelle Jabs. The uterus was again brought through the abdomen. Bilateral round ligaments were elevated and suture ligated. These were transected and held. A window between the utero-ovarian on either side was made and with zepplin clamps this was clamped, cut and then suture ligated with 0 Vicryl free tie followed by a Stick tie of the same. This was repeated on other side and the uterine arteries were skeletonized. Ureters were visualized. Curved clamps were used to occlude the uterine vessels bilaterally and they were cut and suture ligated. Once this was completed, the uterus was amputated from the cervix. A series of straight breaks were then taken to the edges of the cervical and external os. In turn, these were clamped, cut and suture ligated. Once we reached the lower levels, the sharply curved clamps were placed beneath the cervix and the cervix was amputated with sharp and blunt scissors. This was sent to Pathology. The vaginal cuff was closed with 0 Vicryl in a series of figure-of-eight stitches and held. The bilateral fallopian tubes were then elevated and the left side was transected with the Bovie. However, there was some slight oozing and this was sutured with 3-0 Monocryl. The hemostasis was excellent. The left side was elevated and a Ronelle Jabs was placed beneath and the fallopian tube was excised with Metzenbaum scissors.   3-0 Vicryl suture was then used to occlude the vessels. Hemostasis was again excellent. Parth was placed on the pedicles. These were inspected and found to be peristalsing freely. All laps were removed from the belly. The Pedro retractor was removed from the belly. The peritoneum was closed with 0 Vicryl. The fascia was inspected and found to be hemostatic. The fascia was closed with 0 PDS from either corner and met in the midline. The incision was injected with Exparel both above and below the fascia. The subcuticular tissue was reapproximated with 2-0 plain gut and the skin was closed with 4-0 Monocryl in a running subcuticular fashion. Exparel was placed on the subcuticular tissue as well along length of the incision. Steri strips were placed as well as a pressure dressing. Sponge, laps, and needle counts were correct at the end of the case x2. However, a count has been written incorrectly and therefore, an x-ray was done to ensure no retained products were stuck in the belly. This was cleared. She was awoken and taken to the recovery room in stable condition.         Rossi Fernandes MD      [id number]  D:  08/23/2019 10:24  T:  08/23/2019 21:01  JOB #:  [id number]  CC:

## 2019-08-24 NOTE — PROGRESS NOTES
GYN POD 1    Jacobo Nails      +OOB, pain well controlled, tolerating reg diet, han out and waiting to void- han out approx 0700    Vitals:  Visit Vitals  /68   Pulse 97   Temp 98.9 °F (37.2 °C)   Resp 18   LMP 2019   SpO2 90%     Temp (24hrs), Av.2 °F (36.8 °C), Min:97.4 °F (36.3 °C), Max:98.9 °F (37.2 °C)      Last 24hr Input/Output:    Intake/Output Summary (Last 24 hours) at 2019 1052  Last data filed at 2019 5983  Gross per 24 hour   Intake 920 ml   Output 2025 ml   Net -1105 ml          Exam:  Patient without distress. Abdomen soft, bowel sounds present, expected tenderness. Incision dry and clean without erythema. Lower extremities are negative for swelling, cords, or tenderness.     Labs:   Lab Results   Component Value Date/Time    WBC 11.4 (H) 2019 04:03 AM    WBC 6.8 2019 02:31 PM    HGB 7.8 (L) 2019 04:03 AM    HGB 9.8 (L) 2019 02:31 PM    HCT 25.9 (L) 2019 04:03 AM    HCT 32.7 (L) 2019 02:31 PM    PLATELET 768  04:03 AM    PLATELET 375  02:31 PM       Recent Results (from the past 24 hour(s))   METABOLIC PANEL, BASIC    Collection Time: 19  4:03 AM   Result Value Ref Range    Sodium 144 136 - 145 mmol/L    Potassium 3.7 3.5 - 5.1 mmol/L    Chloride 109 (H) 97 - 108 mmol/L    CO2 28 21 - 32 mmol/L    Anion gap 7 5 - 15 mmol/L    Glucose 104 (H) 65 - 100 mg/dL    BUN 8 6 - 20 MG/DL    Creatinine 0.78 0.55 - 1.02 MG/DL    BUN/Creatinine ratio 10 (L) 12 - 20      GFR est AA >60 >60 ml/min/1.73m2    GFR est non-AA >60 >60 ml/min/1.73m2    Calcium 7.8 (L) 8.5 - 10.1 MG/DL   CBC W/O DIFF    Collection Time: 19  4:03 AM   Result Value Ref Range    WBC 11.4 (H) 3.6 - 11.0 K/uL    RBC 2.75 (L) 3.80 - 5.20 M/uL    HGB 7.8 (L) 11.5 - 16.0 g/dL    HCT 25.9 (L) 35.0 - 47.0 %    MCV 94.2 80.0 - 99.0 FL    MCH 28.4 26.0 - 34.0 PG    MCHC 30.1 30.0 - 36.5 g/dL    RDW 21.7 (H) 11.5 - 14.5 % PLATELET 846 379 - 112 K/uL    MPV 10.9 8.9 - 12.9 FL    NRBC 0.0 0  WBC    ABSOLUTE NRBC 0.00 0.00 - 0.01 K/uL     Assessment: POD 1 s/p APRIL TANISHA salpingectomy, stable    Plan: Continue routine post-op care  Advance diet  Oral pain medications  Ambulate  1. Routine care  2. ADAT, OOB, ambulate, SLIV  3. Advised to try and void now, address straight cath if cannot void.

## 2019-08-25 VITALS
SYSTOLIC BLOOD PRESSURE: 117 MMHG | RESPIRATION RATE: 17 BRPM | HEART RATE: 95 BPM | OXYGEN SATURATION: 95 % | DIASTOLIC BLOOD PRESSURE: 65 MMHG | TEMPERATURE: 98.7 F

## 2019-08-25 PROCEDURE — 74011250637 HC RX REV CODE- 250/637: Performed by: OBSTETRICS & GYNECOLOGY

## 2019-08-25 PROCEDURE — 77010033678 HC OXYGEN DAILY

## 2019-08-25 PROCEDURE — 94760 N-INVAS EAR/PLS OXIMETRY 1: CPT

## 2019-08-25 RX ORDER — OXYCODONE AND ACETAMINOPHEN 5; 325 MG/1; MG/1
1 TABLET ORAL
Qty: 20 TAB | Refills: 0 | Status: SHIPPED | OUTPATIENT
Start: 2019-08-25 | End: 2019-08-28

## 2019-08-25 RX ADMIN — DOCUSATE SODIUM 100 MG: 100 CAPSULE, LIQUID FILLED ORAL at 08:07

## 2019-08-25 RX ADMIN — OXYCODONE HYDROCHLORIDE AND ACETAMINOPHEN 2 TABLET: 5; 325 TABLET ORAL at 13:07

## 2019-08-25 RX ADMIN — OXYCODONE HYDROCHLORIDE AND ACETAMINOPHEN 2 TABLET: 5; 325 TABLET ORAL at 04:13

## 2019-08-25 RX ADMIN — Medication 10 ML: at 04:24

## 2019-08-25 NOTE — PROGRESS NOTES
Advised by tech on unit patients vital signs abnormal, please see doc flow sheet. Re checked VS noted increased in BP to 114/60 to right arm, P99, O2 95% on 2l, Temp 99.7. Encouraged patient to use IS 10x an hour while awake. Will cont to monitor    8/25/19 0413    Noted patients O2 in the 70's on the monitor. Patient was not in acute distress. Patient has nail polish to all fingers. Placed pulse ox to patients ear and obtained 92% O2 on RA. Educated patient on IS and encouraged to use 10x an hour while awake, pt verb understanding.   Will cont to monitor

## 2019-08-25 NOTE — DISCHARGE INSTRUCTIONS
Abdominal Hysterectomy Discharge Instructions    Patient ID:  Darryn Conner  [id number]  67 y.o.  1975    What to do at [address]: AS TOLERATED                                    AVOID [address] PLENTY OF LIQUIDS    Recommended activity: GRADUALLY RESUME NORMAL ACTIVITIES OVER 4 WEEKS, LIMIT STAIRS AND LIFTING  NO DRIVING FOR 2 WEEKS  NOTHING IN VAGINA FOR 4 WEEKS      Call your doctor if you experience any of the following symptoms. FEVER OR CHILLS  HEAVY VAGINAL DRAINAGE OR SMELLY DISCHARGE  REDNESS, BLEEDING OR DISCHARGE AT INCISION SITE  PAIN OR SWELLING IN YOU LEGS    Follow-up with Dr. Elizabeth Wall in 6 weeks. Abdominal Hysterectomy: What to Expect at [address] can expect to feel better and stronger each day, although you may need pain medicine for a week or two. You may get tired easily or have less energy than usual. This may last for several weeks after surgery. You will probably notice that your belly is swollen and puffy. This is common. The swelling will take several weeks to go down. You may take 6 to 8 weeks to fully recover. It is important to avoid lifting while you are recovering so that you can heal.    This care sheet gives you a general idea about how long it will take for you to recover. But each person recovers at a different pace. Follow the steps below to get better as quickly as possible. How can you care for yourself at home? Activity  Rest when you feel tired. Getting enough sleep will help you recover. Try to walk each day. Start by walking a little more than you did the day before. Bit by bit, increase the amount you walk. Walking boosts blood flow and helps prevent pneumonia and constipation. Avoid lifting anything that would make you strain. This may include heavy grocery bags and milk containers, a heavy briefcase or backpack, cat litter or dog food bags, a child, or a vacuum .   Avoid strenuous activities, such as biking, jogging, weight lifting, or aerobic exercise, until your doctor says it is okay. You may shower. Pat the cut (incision) dry. Do not take a bath for the first 2 weeks, or until your doctor tells you it is okay. You may drive when you are no longer taking prescription pain medicine and can quickly move your foot from the gas pedal to the brake. You must also be able to sit comfortably for a long period of time, even if you do not plan to go far. You might get caught in traffic. You will probably need to take 2 to 4 weeks off from work. It depends on the type of work you do and how you feel. Pelvic rest for 6weeks    Diet  You can eat your normal diet. If your stomach is upset, try bland, low-fat foods like plain rice, broiled chicken, toast, and yogurt. Drink plenty of fluids (unless your doctor tells you not to). You may notice that your bowel movements are not regular right after your surgery. This is common. Try to avoid constipation and straining with bowel movements. You may want to take a fiber supplement every day. If you have not had a bowel movement after a couple of days, ask your doctor about taking a mild laxative. Medicines  Take pain medicines exactly as directed. If the doctor gave you a prescription medicine for pain, take it as prescribed. If you are not taking a prescription pain medicine, take an over-the-counter medicine such as acetaminophen (Tylenol), ibuprofen (Advil, Motrin), or naproxen (Aleve). Read and follow all instructions on the label. Do not take two or more pain medicines at the same time unless the doctor told you to. Many pain medicines have acetaminophen, which is Tylenol. Too much Tylenol can be harmful. If your doctor prescribed antibiotics, take them as directed. Do not stop taking them just because you feel better. You need to take the full course of antibiotics.   If you think your pain medicine is making you sick to your stomach: Take your medicine after meals (unless your doctor has told you not to). Ask your doctor for a different pain medicine. Incision care  If you have strips of tape on the cut (incision) the doctor made, leave the tape on for a week and then remove them. Wash the area daily with warm, soapy water, and pat it dry. Other cleaning products, such as hydrogen peroxide, can make the wound heal more slowly. You may cover the area with a gauze bandage if it weeps or rubs against clothing. Change the bandage every day. Keep the area clean and dry. If necessary, you may dry the incision with a hair dryer on a cool setting after showering. Other instructions  You may have some light vaginal bleeding. Wear sanitary pads if needed. Do not douche or use tampons. Follow-up care is a key part of your treatment and safety. Be sure to make and go to all appointments, and call your doctor if you are having problems. It's also a good idea to know your test results and keep a list of the medicines you take. When should you call for help? Call 911 anytime you think you may need emergency care. For example, call if:  You pass out (lose consciousness). You have sudden chest pain and shortness of breath, or you cough up blood. You have severe pain in your belly. Call your doctor now or seek immediate medical care if:  You have bright red vaginal bleeding that soaks one or more pads in an hour, or you have large clots. You have foul-smelling discharge from your vagina. You are sick to your stomach or cannot keep fluids down. You have signs of infection, such as: Increased pain, swelling, warmth, or redness. Red streaks leading from the incision. Pus draining from the incision. Swollen lymph nodes in your neck, armpits, or groin. A fever. You have pain that does not get better after you take pain medicine. You have loose stitches, or your incision comes open.   You have signs of a blood clot, such as:  Pain in your calf, back of knee, thigh, or groin. Redness and swelling in your leg or groin. You have trouble passing urine or stool, especially if you have pain or swelling in your lower belly. You have hot flashes, sweating, flushing, or a fast or pounding heartbeat. Watch closely for changes in your health, and be sure to contact your doctor if:  You do not have a bowel movement after taking a laxative.

## 2019-08-25 NOTE — PROGRESS NOTES
GYN POD 2    Jacobo Nails      +OOB, pain well controlled, tolerating reg diet, +void. Feels well, ready for home    Vitals:  Visit Vitals  /65 (BP 1 Location: Right arm)   Pulse 95   Temp 98.7 °F (37.1 °C)   Resp 17   LMP 2019   SpO2 95%     Temp (24hrs), Av °F (37.2 °C), Min:98.3 °F (36.8 °C), Max:100.1 °F (37.8 °C)      Last 24hr Input/Output:    Intake/Output Summary (Last 24 hours) at 2019 0719  Last data filed at 2019 1452  Gross per 24 hour   Intake --   Output 350 ml   Net -350 ml          Exam:  Patient without distress. Lungs clear              Abdomen soft, bowel sounds present, expected tenderness. Incision dry and clean without erythema. Lower extremities are negative for swelling, cords, or tenderness. Labs:   Lab Results   Component Value Date/Time    WBC 11.4 (H) 2019 04:03 AM    WBC 6.8 2019 02:31 PM    HGB 7.8 (L) 2019 04:03 AM    HGB 9.8 (L) 2019 02:31 PM    HCT 25.9 (L) 2019 04:03 AM    HCT 32.7 (L) 2019 02:31 PM    PLATELET 977  04:03 AM    PLATELET 204  02:31 PM       No results found for this or any previous visit (from the past 24 hour(s)). Assessment: POD 2 s/p APRIL TANISHA salpingectomy, fibroids.  stable    Plan: Continue routine post-op care  Oral pain medications  Ambulate  Doing well, routine care  Discharge today

## 2019-08-31 ENCOUNTER — HOSPITAL ENCOUNTER (INPATIENT)
Age: 44
LOS: 3 days | Discharge: HOME | DRG: 863 | End: 2019-09-03
Attending: OBSTETRICS & GYNECOLOGY | Admitting: OBSTETRICS & GYNECOLOGY
Payer: COMMERCIAL

## 2019-08-31 PROBLEM — N73.9 PELVIC ABSCESS IN FEMALE: Status: ACTIVE | Noted: 2019-08-31

## 2019-08-31 PROCEDURE — 74011000258 HC RX REV CODE- 258: Performed by: OBSTETRICS & GYNECOLOGY

## 2019-08-31 PROCEDURE — 65270000029 HC RM PRIVATE

## 2019-08-31 PROCEDURE — 74011250636 HC RX REV CODE- 250/636: Performed by: OBSTETRICS & GYNECOLOGY

## 2019-08-31 RX ORDER — SODIUM CHLORIDE 0.9 % (FLUSH) 0.9 %
5-40 SYRINGE (ML) INJECTION AS NEEDED
Status: DISCONTINUED | OUTPATIENT
Start: 2019-08-31 | End: 2019-09-03 | Stop reason: HOSPADM

## 2019-08-31 RX ORDER — SODIUM CHLORIDE 0.9 % (FLUSH) 0.9 %
5-40 SYRINGE (ML) INJECTION EVERY 8 HOURS
Status: DISCONTINUED | OUTPATIENT
Start: 2019-08-31 | End: 2019-09-03 | Stop reason: HOSPADM

## 2019-08-31 RX ORDER — SODIUM CHLORIDE 9 MG/ML
125 INJECTION, SOLUTION INTRAVENOUS CONTINUOUS
Status: DISCONTINUED | OUTPATIENT
Start: 2019-08-31 | End: 2019-09-03 | Stop reason: HOSPADM

## 2019-08-31 RX ORDER — IBUPROFEN 400 MG/1
800 TABLET ORAL
Status: DISCONTINUED | OUTPATIENT
Start: 2019-08-31 | End: 2019-09-03 | Stop reason: HOSPADM

## 2019-08-31 RX ORDER — ZOLPIDEM TARTRATE 5 MG/1
5 TABLET ORAL
Status: DISCONTINUED | OUTPATIENT
Start: 2019-08-31 | End: 2019-09-03 | Stop reason: HOSPADM

## 2019-08-31 RX ORDER — NALOXONE HYDROCHLORIDE 0.4 MG/ML
0.4 INJECTION, SOLUTION INTRAMUSCULAR; INTRAVENOUS; SUBCUTANEOUS AS NEEDED
Status: DISCONTINUED | OUTPATIENT
Start: 2019-08-31 | End: 2019-09-03 | Stop reason: HOSPADM

## 2019-08-31 RX ORDER — HYDROCODONE BITARTRATE AND ACETAMINOPHEN 5; 325 MG/1; MG/1
1 TABLET ORAL
Status: DISCONTINUED | OUTPATIENT
Start: 2019-08-31 | End: 2019-09-03 | Stop reason: HOSPADM

## 2019-08-31 RX ADMIN — Medication 10 ML: at 23:40

## 2019-08-31 RX ADMIN — PIPERACILLIN SODIUM,TAZOBACTAM SODIUM 3.38 G: 3; .375 INJECTION, POWDER, FOR SOLUTION INTRAVENOUS at 23:39

## 2019-08-31 RX ADMIN — SODIUM CHLORIDE 125 ML/HR: 900 INJECTION, SOLUTION INTRAVENOUS at 23:29

## 2019-09-01 LAB
ANION GAP SERPL CALC-SCNC: 4 MMOL/L (ref 5–15)
BASOPHILS # BLD: 0 K/UL (ref 0–0.1)
BASOPHILS NFR BLD: 0 % (ref 0–1)
BLASTS NFR BLD MANUAL: 0 %
BUN SERPL-MCNC: 6 MG/DL (ref 6–20)
BUN/CREAT SERPL: 9 (ref 12–20)
CALCIUM SERPL-MCNC: 8.3 MG/DL (ref 8.5–10.1)
CHLORIDE SERPL-SCNC: 107 MMOL/L (ref 97–108)
CO2 SERPL-SCNC: 28 MMOL/L (ref 21–32)
CREAT SERPL-MCNC: 0.69 MG/DL (ref 0.55–1.02)
DIFFERENTIAL METHOD BLD: ABNORMAL
EOSINOPHIL # BLD: 0.3 K/UL (ref 0–0.4)
EOSINOPHIL NFR BLD: 2 % (ref 0–7)
ERYTHROCYTE [DISTWIDTH] IN BLOOD BY AUTOMATED COUNT: 21.6 % (ref 11.5–14.5)
GLUCOSE SERPL-MCNC: 95 MG/DL (ref 65–100)
HCT VFR BLD AUTO: 25.1 % (ref 35–47)
HGB BLD-MCNC: 7.6 G/DL (ref 11.5–16)
IMM GRANULOCYTES # BLD AUTO: 0 K/UL
IMM GRANULOCYTES NFR BLD AUTO: 0 %
LYMPHOCYTES # BLD: 2 K/UL (ref 0.8–3.5)
LYMPHOCYTES NFR BLD: 15 % (ref 12–49)
MCH RBC QN AUTO: 27.5 PG (ref 26–34)
MCHC RBC AUTO-ENTMCNC: 30.3 G/DL (ref 30–36.5)
MCV RBC AUTO: 90.9 FL (ref 80–99)
METAMYELOCYTES NFR BLD MANUAL: 0 %
MONOCYTES # BLD: 1.2 K/UL (ref 0–1)
MONOCYTES NFR BLD: 9 % (ref 5–13)
MYELOCYTES NFR BLD MANUAL: 1 %
NEUTS BAND NFR BLD MANUAL: 0 % (ref 0–6)
NEUTS SEG # BLD: 9.8 K/UL (ref 1.8–8)
NEUTS SEG NFR BLD: 73 % (ref 32–75)
NRBC # BLD: 0 K/UL (ref 0–0.01)
NRBC BLD-RTO: 0 PER 100 WBC
OTHER CELLS NFR BLD MANUAL: 0 %
PLATELET # BLD AUTO: 490 K/UL (ref 150–400)
PMV BLD AUTO: 9.5 FL (ref 8.9–12.9)
POTASSIUM SERPL-SCNC: 3 MMOL/L (ref 3.5–5.1)
PROMYELOCYTES NFR BLD MANUAL: 0 %
RBC # BLD AUTO: 2.76 M/UL (ref 3.8–5.2)
RBC MORPH BLD: ABNORMAL
RBC MORPH BLD: ABNORMAL
SODIUM SERPL-SCNC: 139 MMOL/L (ref 136–145)
WBC # BLD AUTO: 13.4 K/UL (ref 3.6–11)

## 2019-09-01 PROCEDURE — 65270000029 HC RM PRIVATE

## 2019-09-01 PROCEDURE — 74011250636 HC RX REV CODE- 250/636: Performed by: OBSTETRICS & GYNECOLOGY

## 2019-09-01 PROCEDURE — 77030018846 HC SOL IRR STRL H20 ICUM -A

## 2019-09-01 PROCEDURE — 74011250637 HC RX REV CODE- 250/637: Performed by: OBSTETRICS & GYNECOLOGY

## 2019-09-01 PROCEDURE — 80048 BASIC METABOLIC PNL TOTAL CA: CPT

## 2019-09-01 PROCEDURE — 36415 COLL VENOUS BLD VENIPUNCTURE: CPT

## 2019-09-01 PROCEDURE — 85027 COMPLETE CBC AUTOMATED: CPT

## 2019-09-01 PROCEDURE — 74011000258 HC RX REV CODE- 258: Performed by: OBSTETRICS & GYNECOLOGY

## 2019-09-01 RX ORDER — VALACYCLOVIR HYDROCHLORIDE 500 MG/1
500 TABLET, FILM COATED ORAL DAILY
COMMUNITY
End: 2020-09-29

## 2019-09-01 RX ORDER — PAROXETINE HYDROCHLORIDE 20 MG/1
10 TABLET, FILM COATED ORAL DAILY
Status: DISCONTINUED | OUTPATIENT
Start: 2019-09-01 | End: 2019-09-01

## 2019-09-01 RX ORDER — VALACYCLOVIR HYDROCHLORIDE 500 MG/1
1000 TABLET, FILM COATED ORAL DAILY
Status: DISCONTINUED | OUTPATIENT
Start: 2019-09-01 | End: 2019-09-01

## 2019-09-01 RX ORDER — PAROXETINE 30 MG/1
30 TABLET, FILM COATED ORAL DAILY
COMMUNITY
End: 2020-09-29

## 2019-09-01 RX ORDER — POTASSIUM CHLORIDE 1.5 G/1.77G
20 POWDER, FOR SOLUTION ORAL 2 TIMES DAILY WITH MEALS
Status: COMPLETED | OUTPATIENT
Start: 2019-09-01 | End: 2019-09-02

## 2019-09-01 RX ORDER — VALACYCLOVIR HYDROCHLORIDE 500 MG/1
500 TABLET, FILM COATED ORAL DAILY
Status: DISCONTINUED | OUTPATIENT
Start: 2019-09-01 | End: 2019-09-03 | Stop reason: HOSPADM

## 2019-09-01 RX ORDER — LISINOPRIL 5 MG/1
10 TABLET ORAL DAILY
Status: DISCONTINUED | OUTPATIENT
Start: 2019-09-01 | End: 2019-09-03 | Stop reason: HOSPADM

## 2019-09-01 RX ORDER — DOCUSATE SODIUM 100 MG/1
100 CAPSULE, LIQUID FILLED ORAL DAILY
Status: DISCONTINUED | OUTPATIENT
Start: 2019-09-01 | End: 2019-09-03 | Stop reason: HOSPADM

## 2019-09-01 RX ORDER — ATORVASTATIN CALCIUM 10 MG/1
10 TABLET, FILM COATED ORAL DAILY
Status: DISCONTINUED | OUTPATIENT
Start: 2019-09-01 | End: 2019-09-03 | Stop reason: HOSPADM

## 2019-09-01 RX ORDER — VALACYCLOVIR HYDROCHLORIDE 500 MG/1
1000 TABLET, FILM COATED ORAL EVERY 12 HOURS
Status: DISCONTINUED | OUTPATIENT
Start: 2019-09-01 | End: 2019-09-01

## 2019-09-01 RX ORDER — PAROXETINE HYDROCHLORIDE 20 MG/1
30 TABLET, FILM COATED ORAL DAILY
Status: DISCONTINUED | OUTPATIENT
Start: 2019-09-01 | End: 2019-09-03 | Stop reason: HOSPADM

## 2019-09-01 RX ORDER — HYDROCHLOROTHIAZIDE 25 MG/1
12.5 TABLET ORAL DAILY
Status: DISCONTINUED | OUTPATIENT
Start: 2019-09-01 | End: 2019-09-03 | Stop reason: HOSPADM

## 2019-09-01 RX ADMIN — MIRABEGRON 25 MG: 25 TABLET, FILM COATED, EXTENDED RELEASE ORAL at 09:16

## 2019-09-01 RX ADMIN — Medication 10 ML: at 05:14

## 2019-09-01 RX ADMIN — Medication 10 ML: at 22:00

## 2019-09-01 RX ADMIN — HYDROCHLOROTHIAZIDE 12.5 MG: 25 TABLET ORAL at 09:14

## 2019-09-01 RX ADMIN — POTASSIUM CHLORIDE 20 MEQ: 1.5 POWDER, FOR SOLUTION ORAL at 12:01

## 2019-09-01 RX ADMIN — PIPERACILLIN SODIUM,TAZOBACTAM SODIUM 3.38 G: 3; .375 INJECTION, POWDER, FOR SOLUTION INTRAVENOUS at 09:15

## 2019-09-01 RX ADMIN — HYDROCODONE BITARTRATE AND ACETAMINOPHEN 1 TABLET: 5; 325 TABLET ORAL at 13:05

## 2019-09-01 RX ADMIN — HYDROCODONE BITARTRATE AND ACETAMINOPHEN 1 TABLET: 5; 325 TABLET ORAL at 01:12

## 2019-09-01 RX ADMIN — PAROXETINE HYDROCHLORIDE 30 MG: 20 TABLET, FILM COATED ORAL at 12:01

## 2019-09-01 RX ADMIN — SODIUM CHLORIDE 125 ML/HR: 900 INJECTION, SOLUTION INTRAVENOUS at 10:55

## 2019-09-01 RX ADMIN — ATORVASTATIN CALCIUM 10 MG: 10 TABLET, FILM COATED ORAL at 09:15

## 2019-09-01 RX ADMIN — LISINOPRIL 10 MG: 5 TABLET ORAL at 09:14

## 2019-09-01 RX ADMIN — PIPERACILLIN SODIUM,TAZOBACTAM SODIUM 3.38 G: 3; .375 INJECTION, POWDER, FOR SOLUTION INTRAVENOUS at 15:32

## 2019-09-01 RX ADMIN — POTASSIUM CHLORIDE 20 MEQ: 1.5 POWDER, FOR SOLUTION ORAL at 18:55

## 2019-09-01 RX ADMIN — VALACYCLOVIR HYDROCHLORIDE 500 MG: 500 TABLET, FILM COATED ORAL at 12:01

## 2019-09-01 NOTE — PROGRESS NOTES
Report received from [facility]. Vital signs and diagnostic films reports received. No questions for transferring nurse.

## 2019-09-01 NOTE — PROGRESS NOTES
Gynecology Progress Note    Memory [place]    Assesment: stable s/p APRIL 19 with pelvic abcess    Plan: Ambulate, feels better on antibiotics. Po KCL for low potassium x 2 days, CBC, BMP in am.    She is without significant complaints. Pain controlled on current medication. Voiding without difficulty. Patient is passing flatus. She is is tolerating her diet. Vitals:  Visit Vitals  /87 (BP 1 Location: Left arm, BP Patient Position: At rest)   Pulse 85   Temp 99.2 °F (37.3 °C)   Resp 18   Ht 5' 4\" (1.626 m)   Wt 115 kg (253 lb 8.5 oz)   LMP 2019   SpO2 100%   BMI 43.52 kg/m²     Temp (24hrs), Av.6 °F (37 °C), Min:98.2 °F (36.8 °C), Max:99.2 °F (37.3 °C)      Last 24hr Input/Output:  No intake or output data in the 24 hours ending 19 1118       Exam:  General: alert, cooperative, no distress, appears stated age      Abdomen: soft, non-tender. Bowel sounds normal. No masses,  no organomegalyIntact.  obese     Extremities: extremities normal, atraumatic, no cyanosis or edema obese      Labs:   Lab Results   Component Value Date/Time    WBC 13.4 (H) 2019 04:19 AM    WBC 11.4 (H) 2019 04:03 AM    WBC 6.8 2019 02:31 PM    HGB 7.6 (L) 2019 04:19 AM    HGB 7.8 (L) 2019 04:03 AM    HGB 9.8 (L) 2019 02:31 PM    HCT 25.1 (L) 2019 04:19 AM    HCT 25.9 (L) 2019 04:03 AM    HCT 32.7 (L) 2019 02:31 PM    PLATELET 804 (H) 6545 04:19 AM    PLATELET 383  04:03 AM    PLATELET 316  02:31 PM       Recent Results (from the past 24 hour(s))   METABOLIC PANEL, BASIC    Collection Time: 19  4:19 AM   Result Value Ref Range    Sodium 139 136 - 145 mmol/L    Potassium 3.0 (L) 3.5 - 5.1 mmol/L    Chloride 107 97 - 108 mmol/L    CO2 28 21 - 32 mmol/L    Anion gap 4 (L) 5 - 15 mmol/L    Glucose 95 65 - 100 mg/dL    BUN 6 6 - 20 MG/DL    Creatinine 0.69 0.55 - 1.02 MG/DL    BUN/Creatinine ratio 9 (L) 12 - 20      GFR est AA >60 >60 ml/min/1.73m2    GFR est non-AA >60 >60 ml/min/1.73m2    Calcium 8.3 (L) 8.5 - 10.1 MG/DL   CBC WITH MANUAL DIFF    Collection Time: 09/01/19  4:19 AM   Result Value Ref Range    WBC 13.4 (H) 3.6 - 11.0 K/uL    RBC 2.76 (L) 3.80 - 5.20 M/uL    HGB 7.6 (L) 11.5 - 16.0 g/dL    HCT 25.1 (L) 35.0 - 47.0 %    MCV 90.9 80.0 - 99.0 FL    MCH 27.5 26.0 - 34.0 PG    MCHC 30.3 30.0 - 36.5 g/dL    RDW 21.6 (H) 11.5 - 14.5 %    PLATELET 453 (H) 075 - 400 K/uL    MPV 9.5 8.9 - 12.9 FL    NRBC 0.0 0  WBC    ABSOLUTE NRBC 0.00 0.00 - 0.01 K/uL    NEUTROPHILS 73 32 - 75 %    BAND NEUTROPHILS 0 0 - 6 %    LYMPHOCYTES 15 12 - 49 %    MONOCYTES 9 5 - 13 %    EOSINOPHILS 2 0 - 7 %    BASOPHILS 0 0 - 1 %    METAMYELOCYTES 0 0 %    MYELOCYTES 1 (H) 0 %    PROMYELOCYTES 0 0 %    BLASTS 0 0 %    OTHER CELL 0 0      IMMATURE GRANULOCYTES 0 %    ABS. NEUTROPHILS 9.8 (H) 1.8 - 8.0 K/UL    ABS. LYMPHOCYTES 2.0 0.8 - 3.5 K/UL    ABS. MONOCYTES 1.2 (H) 0.0 - 1.0 K/UL    ABS. EOSINOPHILS 0.3 0.0 - 0.4 K/UL    ABS. BASOPHILS 0.0 0.0 - 0.1 K/UL    ABS. IMM.  GRANS. 0.0 K/UL    DF MANUAL      RBC COMMENTS ANISOCYTOSIS  2+        RBC COMMENTS HYPOCHROMIA  1+

## 2019-09-01 NOTE — PROGRESS NOTES
Bedside shift change report given to Viki Gonzales RN by Alivia Donahue RN. Report included the following information SBAR, Kardex, Intake/Output, MAR, Accordion and Recent Results.

## 2019-09-01 NOTE — ROUTINE PROCESS
Bedside and Verbal shift change report given to Colette Cheadle, RN (oncoming nurse) by Pee Fay RN (offgoing nurse). Report included the following information SBAR, Kardex, MAR and Accordion.

## 2019-09-01 NOTE — H&P
Gynecology History and Physical    Name: Tracy Ortzi MRN: [id number] SSN: [id number]    YOB: 1975  Age: 45 y.o. Sex: female       Subjective:      Chief complaint:  Foul vaginal discharge    Harry Galvan is a 45 y.o.  female that underwent a APRIL-BSO on 2019 that presents with c/o malodorous vaginal discharge and persistent lower back pain. The patient presented to a hospital in [facility] yesterday and was diagnosed with a pelvic abscess and abdominal wall hematoma. She was started on Zosyn and then transferred to [facility] for further treatment. The patient states that her surgery and post operative course were uneventful except for persistent lower back pain noted on POD 1. She was discharged home on POD 2 and notes that her back pain continued while at home. She has been using a heating pad for pain control with some improvement noted. Has mild lower abdominal pain that she attributes to post op pain.  she noticed a a mucous vaginal discharge that developed a foul odor on Wednesday. The vaginal discharge started to become copious requiring her to wear a sanitary pads and the foul odor resulted in her showering twice daily. Also complains of fever, chills in which she wakes up drenched in sweat. Passing flatus and a small amount of stool. Denies nausea, vomiting, diarrhea, and urinary symptoms. CT scan performed in [facility] shows a 7.0 x 4.3 x 2.2 cm pelvic abscess and a 2.2 x 2.7 x 3.4 cm anterior abdominal wall hematoma. UA c/w possible uti.       OB History    None       GynHx: HSV diagnosed   Past Medical History:   Diagnosis Date    Genital herpes     Hypercholesteremia 2019    Hypertension     Morbid obesity ([address]     Psychiatric disorder     anxiety    Sleep apnea      CPAP compliant   h/o recurrent utis  Overactive bladder  Past Surgical History:   Procedure Laterality Date    HX GYN  2006    C section    HX OTHER SURGICAL Right 1998    cyst removed from right wrist   [facility]  Social History     Occupational History    Not on file   Tobacco Use    Smoking status: Never Smoker    Smokeless tobacco: Never Used   Substance and Sexual Activity    Alcohol use: Yes     Alcohol/week: 1.0 standard drinks     Types: 1 Glasses of wine per week     Comment: 2-3 drinks per month    Drug use: Never    Sexual activity: Not on file     No family history on file. No Known Allergies  Prior to Admission medications    Medication Sig Start Date End Date Taking? Authorizing Provider   valACYclovir (VALTREX) 1 gram tablet Take 1,000 mg by mouth daily. Yes Provider, Historical   lisinopril-hydroCHLOROthiazide (PRINZIDE, ZESTORETIC) 10-12.5 mg per tablet Take 1 Tab by mouth daily. Yes Provider, Historical   atorvastatin (LIPITOR) 10 mg tablet Take 10 mg by mouth daily. Yes Provider, Historical   PARoxetine (PAXIL) 20 mg tablet Take 10 mg by mouth daily. Yes Provider, Historical   ferrous sulfate (IRON PO) Take 1 Tab by mouth daily (after breakfast). Yes Provider, Historical   docusate sodium (STOOL SOFTENER PO) Take 1 Tab by mouth daily. Yes Provider, Historical   mirabegron ER (MYRBETRIQ) 25 mg ER tablet Take 25 mg by mouth daily. Yes Provider, Historical        Review of Systems  A comprehensive review of systems was negative except for that written in the History of Present Illness. Objective:     Vitals:    08/31/19 2247   BP: 137/72   Pulse: 78   Resp: 18   Temp: 98.3 °F (36.8 °C)   SpO2: 97%   Weight: 115 kg (253 lb 8.5 oz)   Height: 5' 4\" (1.626 m)       Physical Exam:  Patient without distress.   Heart: Regular rate and rhythm or S1S2 present  Lung: clear to auscultation throughout lung fields, no wheezes, no rales, no rhonchi and normal respiratory effort  Back: costovertebral angle tenderness present  Abdomen: incision- cdi, soft, distended, normal bowel sounds, mild lower abdominal tenderness   Perineum: light brown malodorous vaginal discharge on pad  Ext: no edema        Assessment/Plan:   Ass: POD 9 s/p APRIL-BSO readmitted for a pelvic abscess  Plan: Zosyn   Resume home medications  Patient informed that drain may need to be placed by IR if inadequate clinical improvement  Plan of care was d/w the patient

## 2019-09-02 LAB
ANION GAP SERPL CALC-SCNC: 6 MMOL/L (ref 5–15)
BACTERIA SPEC CULT: ABNORMAL
BACTERIA SPEC CULT: ABNORMAL
BUN SERPL-MCNC: 6 MG/DL (ref 6–20)
BUN/CREAT SERPL: 8 (ref 12–20)
CALCIUM SERPL-MCNC: 8.5 MG/DL (ref 8.5–10.1)
CHLORIDE SERPL-SCNC: 106 MMOL/L (ref 97–108)
CO2 SERPL-SCNC: 28 MMOL/L (ref 21–32)
CREAT SERPL-MCNC: 0.75 MG/DL (ref 0.55–1.02)
ERYTHROCYTE [DISTWIDTH] IN BLOOD BY AUTOMATED COUNT: 21.5 % (ref 11.5–14.5)
GLUCOSE SERPL-MCNC: 89 MG/DL (ref 65–100)
HCT VFR BLD AUTO: 25.7 % (ref 35–47)
HGB BLD-MCNC: 7.8 G/DL (ref 11.5–16)
MCH RBC QN AUTO: 28.1 PG (ref 26–34)
MCHC RBC AUTO-ENTMCNC: 30.4 G/DL (ref 30–36.5)
MCV RBC AUTO: 92.4 FL (ref 80–99)
NRBC # BLD: 0 K/UL (ref 0–0.01)
NRBC BLD-RTO: 0 PER 100 WBC
PLATELET # BLD AUTO: 525 K/UL (ref 150–400)
PMV BLD AUTO: 9.6 FL (ref 8.9–12.9)
POTASSIUM SERPL-SCNC: 3.6 MMOL/L (ref 3.5–5.1)
RBC # BLD AUTO: 2.78 M/UL (ref 3.8–5.2)
SERVICE CMNT-IMP: ABNORMAL
SODIUM SERPL-SCNC: 140 MMOL/L (ref 136–145)
WBC # BLD AUTO: 11.1 K/UL (ref 3.6–11)

## 2019-09-02 PROCEDURE — 74011250637 HC RX REV CODE- 250/637: Performed by: OBSTETRICS & GYNECOLOGY

## 2019-09-02 PROCEDURE — 65270000029 HC RM PRIVATE

## 2019-09-02 PROCEDURE — 74011000258 HC RX REV CODE- 258: Performed by: OBSTETRICS & GYNECOLOGY

## 2019-09-02 PROCEDURE — 85027 COMPLETE CBC AUTOMATED: CPT

## 2019-09-02 PROCEDURE — 74011250636 HC RX REV CODE- 250/636: Performed by: OBSTETRICS & GYNECOLOGY

## 2019-09-02 PROCEDURE — 80048 BASIC METABOLIC PNL TOTAL CA: CPT

## 2019-09-02 PROCEDURE — 36415 COLL VENOUS BLD VENIPUNCTURE: CPT

## 2019-09-02 RX ADMIN — Medication 10 ML: at 05:39

## 2019-09-02 RX ADMIN — VALACYCLOVIR HYDROCHLORIDE 500 MG: 500 TABLET, FILM COATED ORAL at 08:20

## 2019-09-02 RX ADMIN — PIPERACILLIN SODIUM,TAZOBACTAM SODIUM 3.38 G: 3; .375 INJECTION, POWDER, FOR SOLUTION INTRAVENOUS at 00:57

## 2019-09-02 RX ADMIN — Medication 10 ML: at 21:08

## 2019-09-02 RX ADMIN — PIPERACILLIN SODIUM,TAZOBACTAM SODIUM 3.38 G: 3; .375 INJECTION, POWDER, FOR SOLUTION INTRAVENOUS at 23:12

## 2019-09-02 RX ADMIN — ATORVASTATIN CALCIUM 10 MG: 10 TABLET, FILM COATED ORAL at 08:19

## 2019-09-02 RX ADMIN — MIRABEGRON 25 MG: 25 TABLET, FILM COATED, EXTENDED RELEASE ORAL at 08:20

## 2019-09-02 RX ADMIN — PIPERACILLIN SODIUM,TAZOBACTAM SODIUM 3.38 G: 3; .375 INJECTION, POWDER, FOR SOLUTION INTRAVENOUS at 08:19

## 2019-09-02 RX ADMIN — POTASSIUM CHLORIDE 20 MEQ: 1.5 POWDER, FOR SOLUTION ORAL at 16:30

## 2019-09-02 RX ADMIN — LISINOPRIL 10 MG: 5 TABLET ORAL at 08:19

## 2019-09-02 RX ADMIN — HYDROCODONE BITARTRATE AND ACETAMINOPHEN 1 TABLET: 5; 325 TABLET ORAL at 01:25

## 2019-09-02 RX ADMIN — HYDROCODONE BITARTRATE AND ACETAMINOPHEN 1 TABLET: 5; 325 TABLET ORAL at 09:11

## 2019-09-02 RX ADMIN — HYDROCHLOROTHIAZIDE 12.5 MG: 25 TABLET ORAL at 08:19

## 2019-09-02 RX ADMIN — DOCUSATE SODIUM 100 MG: 100 CAPSULE, LIQUID FILLED ORAL at 08:20

## 2019-09-02 RX ADMIN — PAROXETINE HYDROCHLORIDE 30 MG: 20 TABLET, FILM COATED ORAL at 08:32

## 2019-09-02 RX ADMIN — POTASSIUM CHLORIDE 20 MEQ: 1.5 POWDER, FOR SOLUTION ORAL at 12:03

## 2019-09-02 RX ADMIN — IBUPROFEN 800 MG: 400 TABLET, FILM COATED ORAL at 13:40

## 2019-09-02 RX ADMIN — SODIUM CHLORIDE 125 ML/HR: 900 INJECTION, SOLUTION INTRAVENOUS at 23:12

## 2019-09-02 RX ADMIN — ZOLPIDEM TARTRATE 5 MG: 5 TABLET ORAL at 21:05

## 2019-09-02 RX ADMIN — IBUPROFEN 800 MG: 400 TABLET, FILM COATED ORAL at 21:05

## 2019-09-02 RX ADMIN — SODIUM CHLORIDE 125 ML/HR: 900 INJECTION, SOLUTION INTRAVENOUS at 15:06

## 2019-09-02 RX ADMIN — SODIUM CHLORIDE 125 ML/HR: 900 INJECTION, SOLUTION INTRAVENOUS at 00:56

## 2019-09-02 RX ADMIN — PIPERACILLIN SODIUM,TAZOBACTAM SODIUM 3.38 G: 3; .375 INJECTION, POWDER, FOR SOLUTION INTRAVENOUS at 16:30

## 2019-09-02 RX ADMIN — Medication 10 ML: at 13:34

## 2019-09-02 RX ADMIN — SODIUM CHLORIDE 125 ML/HR: 900 INJECTION, SOLUTION INTRAVENOUS at 08:18

## 2019-09-02 NOTE — PROGRESS NOTES
Bedside and Verbal shift change report given to Stevie Elena RN (oncoming nurse) by Mary Benjamin RN (offgoing nurse). Report included the following information SBAR, Kardex, MAR and Accordion.

## 2019-09-02 NOTE — PROGRESS NOTES
GYN HD 2/ POD 11    Nicaracquel Durand Zackary      W/o c/o, overall improving and feeling well and better than Saturday when presented in [facility]. OOB, fazal reg diet, +flatus- no surgical c/o  Unsure ready for discharge in the next 2 days, states lives too far away for more frequent office f/u    Vitals:  Visit Vitals  /89 (BP 1 Location: Left arm, BP Patient Position: At rest)   Pulse 81   Temp 98.8 °F (37.1 °C)   Resp 18   Ht 5' 4\" (1.626 m)   Wt 115 kg (253 lb 8.5 oz)   LMP 2019   SpO2 97%   BMI 43.52 kg/m²     Temp (24hrs), Av.7 °F (37.1 °C), Min:98.3 °F (36.8 °C), Max:98.8 °F (37.1 °C)      Last 24hr Input/Output:    Intake/Output Summary (Last 24 hours) at 2019 0948  Last data filed at 2019 0932  Gross per 24 hour   Intake 180 ml   Output    Net 180 ml          Exam:  Patient without distress. Lungs clear              Abdomen soft, bowel sounds present, expected tenderness. Incision dry and clean without erythema. Lower extremities are negative for swelling, cords, or tenderness.     Labs:   Lab Results   Component Value Date/Time    WBC 11.1 (H) 2019 05:45 AM    WBC 13.4 (H) 2019 04:19 AM    WBC 11.4 (H) 2019 04:03 AM    WBC 6.8 2019 02:31 PM    HGB 7.8 (L) 2019 05:45 AM    HGB 7.6 (L) 2019 04:19 AM    HGB 7.8 (L) 2019 04:03 AM    HGB 9.8 (L) 2019 02:31 PM    HCT 25.7 (L) 2019 05:45 AM    HCT 25.1 (L) 2019 04:19 AM    HCT 25.9 (L) 2019 04:03 AM    HCT 32.7 (L) 2019 02:31 PM    PLATELET 406 (H)  05:45 AM    PLATELET 143 (H)  04:19 AM    PLATELET 424  04:03 AM    PLATELET 785  02:31 PM       Recent Results (from the past 24 hour(s))   CBC W/O DIFF    Collection Time: 19  5:45 AM   Result Value Ref Range    WBC 11.1 (H) 3.6 - 11.0 K/uL    RBC 2.78 (L) 3.80 - 5.20 M/uL    HGB 7.8 (L) 11.5 - 16.0 g/dL    HCT 25.7 (L) 35.0 - 47.0 %    MCV 92.4 80.0 - 99.0 FL    MCH 28.1 26.0 - 34.0 PG    MCHC 30.4 30.0 - 36.5 g/dL    RDW 21.5 (H) 11.5 - 14.5 %    PLATELET 269 (H) 665 - 400 K/uL    MPV 9.6 8.9 - 12.9 FL    NRBC 0.0 0  WBC    ABSOLUTE NRBC 0.00 0.00 - 3.28 K/uL   METABOLIC PANEL, BASIC    Collection Time: 09/02/19  5:45 AM   Result Value Ref Range    Sodium 140 136 - 145 mmol/L    Potassium 3.6 3.5 - 5.1 mmol/L    Chloride 106 97 - 108 mmol/L    CO2 28 21 - 32 mmol/L    Anion gap 6 5 - 15 mmol/L    Glucose 89 65 - 100 mg/dL    BUN 6 6 - 20 MG/DL    Creatinine 0.75 0.55 - 1.02 MG/DL    BUN/Creatinine ratio 8 (L) 12 - 20      GFR est AA >60 >60 ml/min/1.73m2    GFR est non-AA >60 >60 ml/min/1.73m2    Calcium 8.5 8.5 - 10.1 MG/DL     Assessment: HD2/ POD 11 s/p APRIL TANISHA salpingectomy, readmit for pelvic abscess/ abdm wall hematoma, improving    Plan: stable/ improving, care discussed with weekend MD  - cont IV abx until 48hours, then change to PO  - consider discharge once IV abx complete  - consider pelvic ultrasound with VPFW at f/u visit to monitor cuff collection  - advised that, given unexpected surgical complication, she will be advised to have more frequent office follow up and care  - cont routine post op care, all ques answered.

## 2019-09-02 NOTE — PROGRESS NOTES
Bedside shift change report given to Daniel MCCANN (oncoming nurse) by Samm Gabriel (offgoing nurse). Report included the following information SBAR, Kardex, MAR and Recent Results.

## 2019-09-03 VITALS
HEIGHT: 64 IN | SYSTOLIC BLOOD PRESSURE: 141 MMHG | TEMPERATURE: 97.9 F | OXYGEN SATURATION: 97 % | WEIGHT: 253.53 LBS | RESPIRATION RATE: 18 BRPM | HEART RATE: 71 BPM | BODY MASS INDEX: 43.28 KG/M2 | DIASTOLIC BLOOD PRESSURE: 89 MMHG

## 2019-09-03 PROCEDURE — 74011250637 HC RX REV CODE- 250/637: Performed by: OBSTETRICS & GYNECOLOGY

## 2019-09-03 PROCEDURE — 74011000258 HC RX REV CODE- 258: Performed by: OBSTETRICS & GYNECOLOGY

## 2019-09-03 PROCEDURE — 74011250636 HC RX REV CODE- 250/636: Performed by: OBSTETRICS & GYNECOLOGY

## 2019-09-03 RX ORDER — METRONIDAZOLE 250 MG/1
500 TABLET ORAL EVERY 12 HOURS
Status: DISCONTINUED | OUTPATIENT
Start: 2019-09-03 | End: 2019-09-03 | Stop reason: HOSPADM

## 2019-09-03 RX ORDER — AMOXICILLIN AND CLAVULANATE POTASSIUM 875; 125 MG/1; MG/1
1 TABLET, FILM COATED ORAL EVERY 12 HOURS
Qty: 28 TAB | Refills: 0 | Status: SHIPPED | OUTPATIENT
Start: 2019-09-03 | End: 2019-09-17

## 2019-09-03 RX ORDER — METRONIDAZOLE 500 MG/1
500 TABLET ORAL EVERY 12 HOURS
Qty: 28 TAB | Refills: 0 | Status: SHIPPED | OUTPATIENT
Start: 2019-09-03 | End: 2019-09-17

## 2019-09-03 RX ORDER — IBUPROFEN 800 MG/1
800 TABLET ORAL
Qty: 30 TAB | Refills: 0 | Status: SHIPPED | OUTPATIENT
Start: 2019-09-03 | End: 2020-09-29

## 2019-09-03 RX ORDER — AMOXICILLIN AND CLAVULANATE POTASSIUM 875; 125 MG/1; MG/1
1 TABLET, FILM COATED ORAL 2 TIMES DAILY WITH MEALS
Status: DISCONTINUED | OUTPATIENT
Start: 2019-09-03 | End: 2019-09-03 | Stop reason: HOSPADM

## 2019-09-03 RX ADMIN — AMOXICILLIN AND CLAVULANATE POTASSIUM 1 TABLET: 875; 125 TABLET, FILM COATED ORAL at 16:12

## 2019-09-03 RX ADMIN — METRONIDAZOLE 500 MG: 250 TABLET ORAL at 14:50

## 2019-09-03 RX ADMIN — PAROXETINE HYDROCHLORIDE 30 MG: 20 TABLET, FILM COATED ORAL at 09:04

## 2019-09-03 RX ADMIN — LISINOPRIL 10 MG: 5 TABLET ORAL at 09:05

## 2019-09-03 RX ADMIN — IBUPROFEN 800 MG: 400 TABLET, FILM COATED ORAL at 09:04

## 2019-09-03 RX ADMIN — PIPERACILLIN SODIUM,TAZOBACTAM SODIUM 3.38 G: 3; .375 INJECTION, POWDER, FOR SOLUTION INTRAVENOUS at 09:03

## 2019-09-03 RX ADMIN — DOCUSATE SODIUM 100 MG: 100 CAPSULE, LIQUID FILLED ORAL at 09:05

## 2019-09-03 RX ADMIN — ATORVASTATIN CALCIUM 10 MG: 10 TABLET, FILM COATED ORAL at 09:03

## 2019-09-03 RX ADMIN — MIRABEGRON 25 MG: 25 TABLET, FILM COATED, EXTENDED RELEASE ORAL at 09:06

## 2019-09-03 RX ADMIN — HYDROCHLOROTHIAZIDE 12.5 MG: 25 TABLET ORAL at 09:07

## 2019-09-03 RX ADMIN — VALACYCLOVIR HYDROCHLORIDE 500 MG: 500 TABLET, FILM COATED ORAL at 09:06

## 2019-09-03 RX ADMIN — Medication 10 ML: at 05:27

## 2019-09-03 NOTE — PROGRESS NOTES
Spiritual Care Partner Volunteer visited patient in Med Surg on 9/3/19.   Documented by:Chaplain Bailey Jones., MS., [address] [place] ([id number])

## 2019-09-03 NOTE — DISCHARGE SUMMARY
Gynecology Discharge Summary     Patient ID:  Thomas Regan  [id number]  85 y.o.  1975    Admit date: 8/31/2019    Discharge date and time: 9/3/19     Admission Diagnoses:    Patient Active Problem List   Diagnosis Code    Abnormal uterine bleeding N93.9    Pelvic abscess in female N73.9       Discharge Diagnoses: There are no discharge diagnoses documented for the most recent discharge. Patient Active Problem List   Diagnosis Code    Abnormal uterine bleeding N93.9    Pelvic abscess in female N73.9       Procedures for this admission:     Hospital Course: did well on IV Zosyn. Disposition: home. Rx Motrin, Augmentin and Flagyl    Discharged Condition : stable    Instructions: Follow-up in office  in 1 weeks.               Signed:  Brandin Miranda MD  9/3/2019  1:53 PM

## 2019-09-03 NOTE — PROGRESS NOTES
CM Note:  Met with pt for d/c planning. Reason for Readmission:     Post op hysterectomy complications         RRAT Score and Risk Level:    11      Level of Readmission:    Level I  Admitted 8/23/19; di/c'd 8/25/19. Abnormal uterine bleeding; had APRIL BSO      Care Conference scheduled:   no       Resources/supports as identified by patient/family:   Good family support, co-workers and friends       Top Challenges facing patient (as identified by patient/family and CM): Finances/Medication cost?   Has insurance with Rx coverage    Transportation      Pt drives  Support system or lack thereof? See above  Living arrangements? Lives in a 1 story house with 4 entry steps      Self-care/ADLs/Cognition? Pt stated PTA she was independent with ADL's, was driving and walked unaided. She is alert and oriented. Current Advanced Directive/Advance Care Plan:  Does not have one           Plan for utilizing home health:   none             Transition of Care Plan:    Based on readmission, the patient's previous Plan of Care   has been evaluated and/or modified. The current Transition of Care Plan is:     1. Pt to d/c to home transported by her cousin  2. Pt to f[place] [facility] [place]  3 Pt to complete course of po abx  SKYLA Grant RN    Care Management Interventions  PCP Verified by CM: Yes(Pt is [place] [facility] No NN)  Palliative Care Criteria Met (RRAT>21 & CHF Dx)?: No  Mode of Transport at Discharge:  Other (see comment)(cousin)  Physical Therapy Consult: No  Occupational Therapy Consult: No  Speech Therapy Consult: No  Current Support Network: Own Home, Other(Lives with her 2 children in a 1 story house with 4 entry steps.)  Plan discussed with Pt/Family/Caregiver: Yes  Discharge Location  Discharge Placement: Home with one level

## 2019-09-03 NOTE — DISCHARGE INSTRUCTIONS
Patient Education      Post operative pelvic Abscess: Care Instructions  Your Care Instructions    A tubo-ovarian abscess is a pocket of pus. It forms because of an infection in a fallopian tube and ovary. A tubo-ovarian abscess is most often caused by pelvic inflammatory disease (PID). Your doctor will prescribe antibiotics to treat the abscess. A very large abscess or one that does not go away after antibiotic treatment may need to be drained. Follow-up care is a key part of your treatment and safety. Be sure to make and go to all appointments, and call your doctor if you are having problems. It's also a good idea to know your test results and keep a list of the medicines you take. How can you care for yourself at home? · Take your antibiotics as directed. Do not stop taking them because you feel better. You need to take the full course of antibiotics. · Rest until you feel better. · Take anti-inflammatory medicines to reduce pain. These include ibuprofen (Advil, Motrin) and naproxen (Aleve). Be safe with medicines. Read and follow all instructions on the label. · Use a hot water bottle or a heating pad set on low for belly pain. · Do not have sex or use tampons (use pads instead) until you have seen the doctor well. ·   When should you call for help? Call your doctor now or seek immediate medical care if:    · You have a new or higher fever.     · You have unusual vaginal bleeding.     · You have new or worse belly or pelvic pain.     · You have vaginal discharge that has increased in amount or smells bad.    Watch closely for changes in your health, and be sure to contact your doctor if:    · You do not get better as expected. Where can you learn more? Go to http://paris-jessie.info/. Enter V246 in the search box to learn more about \"Pelvic Abscess: Care Instructions. \"  Current as of: February 19, 2019  Content Version: 12.1  © 8962-7035 Healthwise, [facility].  Care instructions adapted under license by [employer] (which disclaims liability or warranty for this information). If you have questions about a medical condition or this instruction, always ask your healthcare professional. [address] any warranty or liability for your use of this information.

## 2019-09-03 NOTE — PROGRESS NOTES
I have reviewed discharge instructions with the patient. The patient verbalized understanding. Discharge medications reviewed with patient and appropriate educational materials and side effects teaching were provided. Copy of signed AVS placed in patient's chart.   RAMY Garcia

## 2019-09-03 NOTE — PROGRESS NOTES
Gynecology Progress Note    Ledbetter Brinson    Assesment: stable, improved, WBC decreased. Plan: Oral pain medications  Discharge home today with: Medications: ibuprofen and Augmentin and Flagyl Follow up: 1 week Diet: as tolerated Activity: no heavy lifting and pelvic rest    She is without significant complaints. Pain controlled on current medication. Voiding without difficulty. Patient is passing flatus. She is is tolerating her diet. Vitals:  Visit Vitals  /89 (BP 1 Location: Right arm)   Pulse 71   Temp 97.9 °F (36.6 °C)   Resp 18   Ht 5' 4\" (1.626 m)   Wt 115 kg (253 lb 8.5 oz)   LMP 2019   SpO2 97%   BMI 43.52 kg/m²     Temp (24hrs), Av °F (36.7 °C), Min:97.4 °F (36.3 °C), Max:98.3 °F (36.8 °C)      Last 24hr Input/Output:    Intake/Output Summary (Last 24 hours) at 9/3/2019 1346  Last data filed at 2019 1807  Gross per 24 hour   Intake 1420 ml   Output    Net 1420 ml          Exam:  General: alert, cooperative, no distress, appears stated age      Abdomen: soft, non-tender. Bowel sounds normal. No masses,  no organomegaly, incision healedIntact. obese     Extremities: extremities normal, atraumatic, no cyanosis or edema      Labs:   Lab Results   Component Value Date/Time    WBC 11.1 (H) 2019 05:45 AM    WBC 13.4 (H) 2019 04:19 AM    WBC 11.4 (H) 2019 04:03 AM    WBC 6.8 2019 02:31 PM    HGB 7.8 (L) 2019 05:45 AM    HGB 7.6 (L) 2019 04:19 AM    HGB 7.8 (L) 2019 04:03 AM    HGB 9.8 (L) 2019 02:31 PM    HCT 25.7 (L) 2019 05:45 AM    HCT 25.1 (L) 2019 04:19 AM    HCT 25.9 (L) 2019 04:03 AM    HCT 32.7 (L) 2019 02:31 PM    PLATELET 878 (H)  05:45 AM    PLATELET 529 (H)  04:19 AM    PLATELET 800  04:03 AM    PLATELET 272  02:31 PM       No results found for this or any previous visit (from the past 24 hour(s)).

## 2019-10-15 ENCOUNTER — ED HISTORICAL/CONVERTED ENCOUNTER (OUTPATIENT)
Dept: OTHER | Age: 44
End: 2019-10-15

## 2020-02-26 ENCOUNTER — ED HISTORICAL/CONVERTED ENCOUNTER (OUTPATIENT)
Dept: OTHER | Age: 45
End: 2020-02-26

## 2020-09-29 ENCOUNTER — HOSPITAL ENCOUNTER (EMERGENCY)
Age: 45
Discharge: HOME | End: 2020-09-29
Attending: FAMILY MEDICINE
Payer: COMMERCIAL

## 2020-09-29 VITALS
BODY MASS INDEX: 42.68 KG/M2 | OXYGEN SATURATION: 98 % | WEIGHT: 250 LBS | RESPIRATION RATE: 16 BRPM | HEIGHT: 64 IN | SYSTOLIC BLOOD PRESSURE: 125 MMHG | DIASTOLIC BLOOD PRESSURE: 91 MMHG | TEMPERATURE: 98.9 F | HEART RATE: 91 BPM

## 2020-09-29 DIAGNOSIS — Z20.2 STD EXPOSURE: Primary | ICD-10-CM

## 2020-09-29 PROCEDURE — 74011000250 HC RX REV CODE- 250: Performed by: FAMILY MEDICINE

## 2020-09-29 PROCEDURE — 87210 SMEAR WET MOUNT SALINE/INK: CPT

## 2020-09-29 PROCEDURE — 99283 EMERGENCY DEPT VISIT LOW MDM: CPT

## 2020-09-29 PROCEDURE — 74011250636 HC RX REV CODE- 250/636: Performed by: FAMILY MEDICINE

## 2020-09-29 PROCEDURE — 87491 CHLMYD TRACH DNA AMP PROBE: CPT

## 2020-09-29 RX ORDER — AZITHROMYCIN 500 MG/1
1000 TABLET, FILM COATED ORAL ONCE
Qty: 2 TAB | Refills: 0 | Status: SHIPPED | OUTPATIENT
Start: 2020-09-29 | End: 2020-09-29

## 2020-09-29 RX ADMIN — LIDOCAINE HYDROCHLORIDE 250 MG: 10 INJECTION, SOLUTION INFILTRATION; PERINEURAL at 15:10

## 2020-09-29 NOTE — ED PROVIDER NOTES
EMERGENCY DEPARTMENT HISTORY AND PHYSICAL EXAM      Date: 9/29/2020  Patient Name: J Carlos Sharma    History of Presenting Illness     Chief Complaint   Patient presents with    Sexually Transmitted Disease       History Provided By: Patient    HPI: J Carlos Sharma, 39 y.o. female with a past medical history significant hypertension and Psychiatric disorder presents to the ED with cc of exposure to STD. Sexually Transmitted Disease   The incident occurred more than 2 days ago. The sexual encounter was with an acquaintance (unprotected). Associated symptoms include vaginal discharge. Pertinent negatives include no abdominal pain, no vaginal pain, no vaginal bleeding and no rectal pain. There has been no physical assault. There is a concern regarding sexually transmitted diseases. There is no HIV concern. She has tried nothing for the symptoms. There are no other complaints, changes, or physical findings at this time. PCP: Jordin, MD Susie    No current facility-administered medications on file prior to encounter. Current Outpatient Medications on File Prior to Encounter   Medication Sig Dispense Refill    [DISCONTINUED] ibuprofen (MOTRIN) 800 mg tablet Take 1 Tab by mouth every eight (8) hours as needed for Pain. 30 Tab 0    [DISCONTINUED] PARoxetine (PAXIL) 30 mg tablet Take 30 mg by mouth daily.  [DISCONTINUED] valACYclovir (VALTREX) 500 mg tablet Take 500 mg by mouth daily.  [DISCONTINUED] lisinopril-hydroCHLOROthiazide (PRINZIDE, ZESTORETIC) 10-12.5 mg per tablet Take 1 Tab by mouth daily.  [DISCONTINUED] atorvastatin (LIPITOR) 10 mg tablet Take 10 mg by mouth daily.  [DISCONTINUED] ferrous sulfate (IRON PO) Take 1 Tab by mouth daily (after breakfast).  [DISCONTINUED] docusate sodium (STOOL SOFTENER PO) Take 1 Tab by mouth daily.  [DISCONTINUED] mirabegron ER (MYRBETRIQ) 25 mg ER tablet Take 25 mg by mouth daily.          Past History     Past Medical History:  Past Medical History:   Diagnosis Date    Genital herpes     Hypercholesteremia 08/02/2019    Hypertension     Morbid obesity ([address]     Psychiatric disorder     anxiety    Sleep apnea      CPAP compliant       Past Surgical History:  Past Surgical History:   Procedure Laterality Date    HX GYN  2006    C section    HX OTHER SURGICAL Right 1998    cyst removed from right wrist       Family History:  No family history on file. Social History:  Social History     Tobacco Use    Smoking status: Never Smoker    Smokeless tobacco: Never Used   Substance Use Topics    Alcohol use: Yes     Alcohol/week: 1.0 standard drinks     Types: 1 Glasses of wine per week     Comment: 2-3 drinks per month    Drug use: Never       Allergies:  No Known Allergies      Review of Systems     Review of Systems   Constitutional: Negative for appetite change, chills and fever. HENT: Negative for congestion and sore throat. Respiratory: Negative for cough and shortness of breath. Cardiovascular: Negative for chest pain and palpitations. Gastrointestinal: Negative for abdominal pain and rectal pain. Genitourinary: Positive for vaginal discharge. Negative for difficulty urinating, dysuria, flank pain, hematuria, menstrual problem, pelvic pain, urgency, vaginal bleeding and vaginal pain. Musculoskeletal: Negative for arthralgias, back pain and myalgias. Skin: Negative for rash and wound. Allergic/Immunologic: Negative for environmental allergies and food allergies. Neurological: Negative for dizziness and headaches. Psychiatric/Behavioral: Negative for self-injury and suicidal ideas. The patient is not nervous/anxious. All other systems reviewed and are negative. Physical Exam     Physical Exam  Vitals signs and nursing note reviewed. Exam conducted with a chaperone present. Constitutional:       Appearance: Normal appearance. HENT:      Head: Normocephalic and atraumatic.    Eyes: Extraocular Movements: Extraocular movements intact. Conjunctiva/sclera: Conjunctivae normal.   Cardiovascular:      Rate and Rhythm: Normal rate and regular rhythm. Pulses: Normal pulses. Heart sounds: Normal heart sounds. Pulmonary:      Effort: Pulmonary effort is normal.      Breath sounds: Normal breath sounds. Abdominal:      General: Abdomen is flat. Bowel sounds are normal. There is no distension. Palpations: Abdomen is soft. Genitourinary:     General: Normal vulva. Exam position: Supine. Pubic Area: No rash. Vagina: Vaginal discharge (thin and milky) present. Cervix: No cervical motion tenderness, discharge, lesion, erythema or cervical bleeding. Musculoskeletal: Normal range of motion. General: No swelling. Skin:     General: Skin is warm. Capillary Refill: Capillary refill takes less than 2 seconds. Findings: No erythema. Neurological:      General: No focal deficit present. Mental Status: She is alert and oriented to person, place, and time. Psychiatric:         Mood and Affect: Mood normal.         Behavior: Behavior normal.         Thought Content: Thought content normal.         Judgment: Judgment normal.         Diagnostic Study Results     Labs -   No results found for this or any previous visit (from the past 12 hour(s)). Radiologic Studies -   @lastxrresult@  CT Results  (Last 48 hours)    None        CXR Results  (Last 48 hours)    None            Medical Decision Making   I am the first provider for this patient. I reviewed the vital signs, available nursing notes, past medical history, past surgical history, family history and social history. Vital Signs-Reviewed the patient's vital signs.   Patient Vitals for the past 12 hrs:   Temp Pulse Resp BP SpO2   09/29/20 1347 98.9 °F (37.2 °C) 91 16 (!) 125/91 98 %         Provider Notes (Medical Decision Making):   MDM  Number of Diagnoses or Management Options  STD exposure:   Diagnosis management comments: Patient is stable with no marked toxicity or distress. Treated with rocephin Im and d/c'ed on azithromycin PO x 1 day. Sexual education given and recommended further STD testing such as HIV. Also, recommend to inform partner to get tested and/or treated if her tests comes back positive. All questions were answered and there are no apparent barriers to comprehension or communication. The patient verbalizes agreement with the diagnosis, treatment plan, and understanding of the follow-up instructions. The patient is appropriate for discharge; leaves the Emergency Department walking with a stable gait. Patient understands to return to the ED in 12-24 hours for any new or worsening symptoms or no  expected timely resolution of symptoms on mediations prescribed. ED Course:   Initial assessment performed. The patients presenting problems have been discussed, and they are in agreement with the care plan formulated and outlined with them. I have encouraged them to ask questions as they arise throughout their visit. PROCEDURES        PLAN:  1. Current Discharge Medication List      START taking these medications    Details   azithromycin (ZITHROMAX) 500 mg tab Take 2 Tabs by mouth once for 1 dose. Qty: 2 Tab, Refills: 0           2. Follow-up Information    None       Return to ED if worse     Diagnosis     Clinical Impression:   1.  STD exposure

## 2020-09-29 NOTE — ED TRIAGE NOTES
Pt states had unprotected sexual intercourse yesterday, requests STI check. Asymptomatic. Partner had no known STI's.

## 2020-10-02 LAB
C TRACH DNA SPEC QL NAA+PROBE: NEGATIVE
N GONORRHOEA DNA SPEC QL NAA+PROBE: NEGATIVE
SAMPLE TYPE: NORMAL
SERVICE CMNT-IMP: NORMAL
SPECIMEN SOURCE: NORMAL

## 2020-12-19 ENCOUNTER — HOSPITAL ENCOUNTER (EMERGENCY)
Age: 45
Discharge: HOME | End: 2020-12-19
Attending: EMERGENCY MEDICINE
Payer: COMMERCIAL

## 2020-12-19 VITALS
OXYGEN SATURATION: 99 % | WEIGHT: 255 LBS | BODY MASS INDEX: 43.54 KG/M2 | SYSTOLIC BLOOD PRESSURE: 147 MMHG | DIASTOLIC BLOOD PRESSURE: 83 MMHG | RESPIRATION RATE: 16 BRPM | TEMPERATURE: 98.6 F | HEIGHT: 64 IN | HEART RATE: 88 BPM

## 2020-12-19 DIAGNOSIS — N30.00 ACUTE CYSTITIS WITHOUT HEMATURIA: Primary | ICD-10-CM

## 2020-12-19 LAB
APPEARANCE UR: ABNORMAL
BACTERIA URNS QL MICRO: ABNORMAL /HPF
BILIRUB UR QL: NEGATIVE
COLOR UR: YELLOW
EPITH CASTS URNS QL MICRO: ABNORMAL /LPF
GLUCOSE UR STRIP.AUTO-MCNC: NEGATIVE MG/DL
HGB UR QL STRIP: NEGATIVE
KETONES UR QL STRIP.AUTO: NEGATIVE MG/DL
KOH PREP SPEC: NORMAL
KOH PREP SPEC: NORMAL
LEUKOCYTE ESTERASE UR QL STRIP.AUTO: ABNORMAL
NITRITE UR QL STRIP.AUTO: NEGATIVE
PH UR STRIP: 6 [PH] (ref 5–8)
PROT UR STRIP-MCNC: NEGATIVE MG/DL
RBC #/AREA URNS HPF: ABNORMAL /HPF (ref 0–5)
SP GR UR REFRACTOMETRY: 1.01 (ref 1–1.03)
SPECIAL REQUESTS,SREQ: NORMAL
UA: UC IF INDICATED,UAUC: ABNORMAL
UROBILINOGEN UR QL STRIP.AUTO: 1 EU/DL (ref 0.2–1)
WBC URNS QL MICRO: ABNORMAL /HPF (ref 0–4)

## 2020-12-19 PROCEDURE — 99283 EMERGENCY DEPT VISIT LOW MDM: CPT

## 2020-12-19 PROCEDURE — 81001 URINALYSIS AUTO W/SCOPE: CPT

## 2020-12-19 PROCEDURE — 87210 SMEAR WET MOUNT SALINE/INK: CPT

## 2020-12-19 PROCEDURE — 87491 CHLMYD TRACH DNA AMP PROBE: CPT

## 2020-12-19 RX ORDER — LISINOPRIL 10 MG/1
10 TABLET ORAL DAILY
COMMUNITY

## 2020-12-19 RX ORDER — CIPROFLOXACIN 500 MG/1
500 TABLET ORAL 2 TIMES DAILY
Qty: 14 TAB | Refills: 0 | Status: SHIPPED | OUTPATIENT
Start: 2020-12-19 | End: 2020-12-26

## 2020-12-19 RX ORDER — HYDROCHLOROTHIAZIDE 12.5 MG/1
12.5 TABLET ORAL DAILY
COMMUNITY

## 2020-12-19 RX ORDER — PAROXETINE 30 MG/1
30 TABLET, FILM COATED ORAL DAILY
COMMUNITY

## 2020-12-19 NOTE — ED TRIAGE NOTES
Pt states finished amoxicillin and got rash and irritation to vagina. Took a diflucan without relief. Went to PCP yesterday and get a \"swab\". Now rash is worse and noted pink color to urine.

## 2020-12-19 NOTE — ED PROVIDER NOTES
EMERGENCY DEPARTMENT HISTORY AND PHYSICAL EXAM      Date: 12/19/2020  Patient Name: Fadia Evans    History of Presenting Illness     Chief Complaint   Patient presents with    Vaginal Itching       History Provided By: Patient    HPI: Fadia Evans, 39 y.o. female with a past medical history significant hypertension and Depression and other multiple medical problems presents to the ED with cc of recurrent intermittent vaginal itching of insidious onset after she had a course of antibiotics for her dental infection. She has tried 2  tablets of Diflucan and a 1 day Monistat course without relief of symptoms. She says she self swab.her PCPs office yesterday however the results of your vaginal swab but not yet resulted. She is here today for persistent symptoms of vaginal itching. No other constitutional symptoms. No abdominal pain. No fever nausea vomiting, hematuria or dysuria at this time. There are no other complaints, changes, or physical findings at this time. PCP: Other, MD Susie    No current facility-administered medications on file prior to encounter. Current Outpatient Medications on File Prior to Encounter   Medication Sig Dispense Refill    lisinopriL (PRINIVIL, ZESTRIL) 10 mg tablet Take 10 mg by mouth daily.  hydroCHLOROthiazide (HYDRODIURIL) 12.5 mg tablet Take 12.5 mg by mouth daily.  PARoxetine (PaxiL) 30 mg tablet Take 30 mg by mouth daily. Past History     Past Medical History:  Past Medical History:   Diagnosis Date    Genital herpes     Hypercholesteremia 08/02/2019    Hypertension     Morbid obesity ([facility] [address]     Psychiatric disorder     anxiety    Sleep apnea      CPAP compliant       Past Surgical History:  Past Surgical History:   Procedure Laterality Date    HX GYN  2006    C section    HX OTHER SURGICAL Right 1998    cyst removed from right wrist       Family History:  History reviewed. No pertinent family history.     Social History:  Social History     Tobacco Use    Smoking status: Never Smoker    Smokeless tobacco: Never Used   Substance Use Topics    Alcohol use: Yes     Alcohol/week: 1.0 standard drinks     Types: 1 Glasses of wine per week     Comment: 2-3 drinks per month    Drug use: Never       Allergies:  No Known Allergies      Review of Systems     Review of Systems   Constitutional: Negative. HENT: Negative. Eyes: Negative. Respiratory: Negative. Cardiovascular: Negative. Gastrointestinal: Negative. Endocrine: Negative. Genitourinary: Negative. Musculoskeletal: Negative. Skin: Negative. Neurological: Negative. Hematological: Negative. Psychiatric/Behavioral: Negative. All other systems reviewed and are negative. Physical Exam     Physical Exam  Vitals signs and nursing note reviewed. Constitutional:       Appearance: Normal appearance. She is normal weight. HENT:      Head: Normocephalic and atraumatic. Nose: Nose normal.      Mouth/Throat:      Mouth: Mucous membranes are moist.      Pharynx: Oropharynx is clear. Eyes:      Extraocular Movements: Extraocular movements intact. Conjunctiva/sclera: Conjunctivae normal.      Pupils: Pupils are equal, round, and reactive to light. Neck:      Musculoskeletal: Normal range of motion and neck supple. Cardiovascular:      Rate and Rhythm: Normal rate and regular rhythm. Pulses: Normal pulses. Heart sounds: Normal heart sounds. Pulmonary:      Effort: Pulmonary effort is normal.      Breath sounds: Normal breath sounds. Abdominal:      General: Abdomen is flat. Palpations: Abdomen is soft. Musculoskeletal: Normal range of motion. Skin:     General: Skin is warm and dry. Capillary Refill: Capillary refill takes less than 2 seconds. Neurological:      General: No focal deficit present. Mental Status: She is alert and oriented to person, place, and time.    Psychiatric:         Mood and Affect: Mood normal.         Behavior: Behavior normal.         Lab and Diagnostic Study Results     Labs -     Recent Results (from the past 12 hour(s))   URINALYSIS W/ REFLEX CULTURE    Collection Time: 12/19/20  5:39 PM    Specimen: Urine   Result Value Ref Range    Color Yellow      Appearance Cloudy (A) Clear      Specific gravity 1.015 1.003 - 1.030      pH (UA) 6.0 5.0 - 8.0      Protein Negative Negative mg/dL    Glucose Negative Negative mg/dL    Ketone Negative Negative mg/dL    Bilirubin Negative Negative      Blood Negative Negative      Urobilinogen 1.0 0.2 - 1.0 EU/dL    Nitrites Negative Negative      Leukocyte Esterase Large (A) Negative      WBC 5-10 0 - 4 /hpf    RBC 0-5 0 - 5 /hpf    Epithelial cells Many (A) Few /lpf    Bacteria 2+ (A) Negative /hpf    UA:UC IF INDICATED Culture not indicated by UA result Culture not indicated by UA result     KOH, OTHER SOURCES    Collection Time: 12/19/20  5:39 PM    Specimen: Vagina; Other   Result Value Ref Range    Special Requests: No Special Requests      KOH No trichomonas seen      KOH No yeast seen         Radiologic Studies -     CT Results  (Last 48 hours)    None        CXR Results  (Last 48 hours)    None            Medical Decision Making     - I am the first provider for this patient. - I reviewed the vital signs, available nursing notes, past medical history, past surgical history, family history and social history. - Initial assessment performed. The patients presenting problems have been discussed, and they are in agreement with the care plan formulated and outlined with them. I have encouraged them to ask questions as they arise throughout their visit. Vital Signs-Reviewed the patient's vital signs. Patient Vitals for the past 12 hrs:   Temp Pulse Resp BP SpO2   12/19/20 1739     99 %   12/19/20 1652 98.6 °F (37 °C) 88 16 (!) 147/83 98 %       Records Reviewed: Nursing Notes    ED Course/Provider Notes (Medical Decision Making):    Uneventful ED course, clinical improvement with therapy, patient will be discharged to followup with PCP as directed    Disposition     Disposition: Condition stable and improved  DC- Adult Discharges: All of the diagnostic tests were reviewed and questions answered. Diagnosis, care plan and treatment options were discussed. The patient understands the instructions and will follow up as directed. The patients results have been reviewed with them. They have been counseled regarding their diagnosis. The patient verbally convey understanding and agreement of the signs, symptoms, diagnosis, treatment and prognosis and additionally agrees to follow up as recommended with their PCP in 24 - 48 hours. They also agree with the care-plan and convey that all of their questions have been answered. I have also put together some discharge instructions for them that include: 1) educational information regarding their diagnosis, 2) how to care for their diagnosis at home, as well a 3) list of reasons why they would want to return to the ED prior to their follow-up appointment, should their condition change. Discharged    DISCHARGE PLAN:  1. Current Discharge Medication List      CONTINUE these medications which have NOT CHANGED    Details   lisinopriL (PRINIVIL, ZESTRIL) 10 mg tablet Take 10 mg by mouth daily. hydroCHLOROthiazide (HYDRODIURIL) 12.5 mg tablet Take 12.5 mg by mouth daily. PARoxetine (PaxiL) 30 mg tablet Take 30 mg by mouth daily. 2.   Follow-up Information     Follow up With Specialties Details Why Contact Info    Follow-up with PCP of your choice  In 2 days          3. Return to ED if worse   4. Discharge Medication List as of 12/19/2020  6:19 PM      START taking these medications    Details   ciprofloxacin HCl (Cipro) 500 mg tablet Take 1 Tab by mouth two (2) times a day for 7 days. , Normal, Disp-14 Tab, R-0         CONTINUE these medications which have NOT CHANGED    Details   lisinopriL (PRINIVIL, ZESTRIL) 10 mg tablet Take 10 mg by mouth daily. , Historical Med      hydroCHLOROthiazide (HYDRODIURIL) 12.5 mg tablet Take 12.5 mg by mouth daily. , Historical Med      PARoxetine (PaxiL) 30 mg tablet Take 30 mg by mouth daily. , Historical Med               Diagnosis     Clinical Impression:   1. Acute cystitis without hematuria        Attestations:    Charles June MD    Please note that this dictation was completed with [employer], the computer voice recognition software. Quite often unanticipated grammatical, syntax, homophones, and other interpretive errors are inadvertently transcribed by the computer software. Please disregard these errors. Please excuse any errors that have escaped final proofreading. Thank you.

## 2020-12-19 NOTE — DISCHARGE INSTRUCTIONS
Take medicines as prescribed and follow-up with your PCP, return to emergency room for any new or worsening symptoms.

## 2021-08-18 ENCOUNTER — HOSPITAL ENCOUNTER (EMERGENCY)
Age: 46
Discharge: HOME | End: 2021-08-18
Attending: EMERGENCY MEDICINE
Payer: COMMERCIAL

## 2021-08-18 VITALS
TEMPERATURE: 98.2 F | OXYGEN SATURATION: 98 % | BODY MASS INDEX: 42.43 KG/M2 | HEART RATE: 83 BPM | RESPIRATION RATE: 16 BRPM | SYSTOLIC BLOOD PRESSURE: 138 MMHG | DIASTOLIC BLOOD PRESSURE: 85 MMHG | HEIGHT: 65 IN

## 2021-08-18 LAB
APPEARANCE UR: CLEAR
BILIRUB UR QL: NEGATIVE
COLOR UR: YELLOW
GLUCOSE UR STRIP.AUTO-MCNC: NEGATIVE MG/DL
HGB UR QL STRIP: NEGATIVE
KETONES UR QL STRIP.AUTO: NEGATIVE MG/DL
LEUKOCYTE ESTERASE UR QL STRIP.AUTO: NEGATIVE
NITRITE UR QL STRIP.AUTO: NEGATIVE
PH UR STRIP: 5 [PH] (ref 5–8)
PROT UR STRIP-MCNC: NEGATIVE MG/DL
SP GR UR REFRACTOMETRY: 1.01 (ref 1–1.03)
UROBILINOGEN UR QL STRIP.AUTO: 0.1 EU/DL (ref 0.2–1)

## 2021-08-18 PROCEDURE — 81003 URINALYSIS AUTO W/O SCOPE: CPT

## 2021-08-18 PROCEDURE — 75810000275 HC EMERGENCY DEPT VISIT NO LEVEL OF CARE

## 2022-02-21 ENCOUNTER — HOSPITAL ENCOUNTER (EMERGENCY)
Age: 47
Discharge: HOME | End: 2022-02-21
Attending: EMERGENCY MEDICINE
Payer: COMMERCIAL

## 2022-02-21 VITALS
HEIGHT: 64 IN | WEIGHT: 250 LBS | SYSTOLIC BLOOD PRESSURE: 131 MMHG | TEMPERATURE: 98.5 F | DIASTOLIC BLOOD PRESSURE: 74 MMHG | OXYGEN SATURATION: 100 % | BODY MASS INDEX: 42.68 KG/M2 | RESPIRATION RATE: 16 BRPM | HEART RATE: 88 BPM

## 2022-02-21 DIAGNOSIS — Z20.2 POSSIBLE EXPOSURE TO STD: Primary | ICD-10-CM

## 2022-02-21 LAB
APPEARANCE UR: CLEAR
BILIRUB UR QL: NEGATIVE
COLOR UR: YELLOW
GLUCOSE UR STRIP.AUTO-MCNC: NEGATIVE MG/DL
HCG UR QL: NEGATIVE
HGB UR QL STRIP: NEGATIVE
KETONES UR QL STRIP.AUTO: NEGATIVE MG/DL
KOH PREP SPEC: NORMAL
LEUKOCYTE ESTERASE UR QL STRIP.AUTO: NEGATIVE
NITRITE UR QL STRIP.AUTO: NEGATIVE
PH UR STRIP: 5 [PH] (ref 5–8)
PROT UR STRIP-MCNC: NEGATIVE MG/DL
SP GR UR REFRACTOMETRY: 1.01 (ref 1–1.03)
SPECIAL REQUESTS,SREQ: NORMAL
TRICHOMONAS RAPID AG, TRICR: NEGATIVE
UROBILINOGEN UR QL STRIP.AUTO: 0.1 EU/DL (ref 0.2–1)

## 2022-02-21 PROCEDURE — 74011000250 HC RX REV CODE- 250: Performed by: EMERGENCY MEDICINE

## 2022-02-21 PROCEDURE — 87210 SMEAR WET MOUNT SALINE/INK: CPT

## 2022-02-21 PROCEDURE — 81003 URINALYSIS AUTO W/O SCOPE: CPT

## 2022-02-21 PROCEDURE — 99283 EMERGENCY DEPT VISIT LOW MDM: CPT

## 2022-02-21 PROCEDURE — 81025 URINE PREGNANCY TEST: CPT

## 2022-02-21 PROCEDURE — 87491 CHLMYD TRACH DNA AMP PROBE: CPT

## 2022-02-21 PROCEDURE — 74011250636 HC RX REV CODE- 250/636: Performed by: EMERGENCY MEDICINE

## 2022-02-21 PROCEDURE — 74011250637 HC RX REV CODE- 250/637: Performed by: EMERGENCY MEDICINE

## 2022-02-21 PROCEDURE — 87808 TRICHOMONAS ASSAY W/OPTIC: CPT

## 2022-02-21 PROCEDURE — 96372 THER/PROPH/DIAG INJ SC/IM: CPT

## 2022-02-21 RX ORDER — AZITHROMYCIN 250 MG/1
1000 TABLET, FILM COATED ORAL
Status: COMPLETED | OUTPATIENT
Start: 2022-02-21 | End: 2022-02-21

## 2022-02-21 RX ORDER — ACETAMINOPHEN 500 MG
1000 TABLET ORAL ONCE
Status: COMPLETED | OUTPATIENT
Start: 2022-02-21 | End: 2022-02-21

## 2022-02-21 RX ORDER — VALACYCLOVIR HYDROCHLORIDE 500 MG/1
TABLET, FILM COATED ORAL
COMMUNITY

## 2022-02-21 RX ADMIN — CEFTRIAXONE SODIUM 500 MG: 500 INJECTION, POWDER, FOR SOLUTION INTRAMUSCULAR; INTRAVENOUS at 16:36

## 2022-02-21 RX ADMIN — AZITHROMYCIN MONOHYDRATE 1000 MG: 250 TABLET ORAL at 16:37

## 2022-02-21 RX ADMIN — ACETAMINOPHEN 1000 MG: 500 TABLET ORAL at 16:37

## 2022-02-21 NOTE — ED PROVIDER NOTES
EMERGENCY DEPARTMENT HISTORY AND PHYSICAL EXAM        Date: 2/21/2022  Patient Name: Anne Marie Gramajo    History of Presenting Illness     Chief Complaint   Patient presents with    Exposure to STD       History Provided By: Patient    HPI: Anne Marie Gramajo, 41 y.o. female with history of genital herpes, hyperlipidemia, hypertension, and anxiety who presents with possible exposure to STD. States that she had unprotected sexual intercourse with a new partner. She has been having irritation of her vaginal canal since then. It is itchy and discomforting. No pelvic pain. No abnormal vaginal discharge or bleeding. No dysuria. PCP: ARSALAN Mittal    Current Facility-Administered Medications   Medication Dose Route Frequency Provider Last Rate Last Admin    cefTRIAXone (ROCEPHIN) 500 mg in lidocaine (PF) (XYLOCAINE) 10 mg/mL (1 %) IM injection  500 mg IntraMUSCular ONCE German Echevarria DO        azithromycin [facility]) tablet 1,000 mg  1,000 mg Oral NOW Sherri DO Amos         Current Outpatient Medications   Medication Sig Dispense Refill    valACYclovir (VALTREX) 500 mg tablet valacyclovir 500 mg tablet   TAKE 1 TABLET BY MOUTH EVERY DAY      lisinopriL (PRINIVIL, ZESTRIL) 10 mg tablet Take 10 mg by mouth daily.  hydroCHLOROthiazide (HYDRODIURIL) 12.5 mg tablet Take 12.5 mg by mouth daily.  PARoxetine (PaxiL) 30 mg tablet Take 30 mg by mouth daily. Past History     Past Medical History:  Past Medical History:   Diagnosis Date    Genital herpes     Hypercholesteremia 08/02/2019    Hypertension     Morbid obesity ([address]     Psychiatric disorder     anxiety    Sleep apnea      CPAP compliant       Past Surgical History:  Past Surgical History:   Procedure Laterality Date    HX GYN  2006    C section    HX OTHER SURGICAL Right 1998    cyst removed from right wrist       Family History:  History reviewed. No pertinent family history.     Social History:  Social History     Tobacco Use    Smoking status: Never Smoker    Smokeless tobacco: Never Used   Substance Use Topics    Alcohol use: Yes     Alcohol/week: 1.0 standard drink     Types: 1 Glasses of wine per week     Comment: 2-3 drinks per month    Drug use: Never       Allergies:  No Known Allergies      Review of Systems   Review of Systems   Constitutional: Negative for fever. HENT: Negative for congestion. Eyes: Negative for visual disturbance. Respiratory: Negative for shortness of breath. Cardiovascular: Negative for chest pain. Gastrointestinal: Negative for abdominal pain. Genitourinary: Negative for dysuria. Musculoskeletal: Negative for arthralgias. Skin: Negative for rash. Neurological: Negative for headaches. Physical Exam   Constitutional: No acute distress. Well-nourished. Skin: No rash. ENT: No rhinorrhea. No cough. Head is normocephalic and atraumatic. Eye: No proptosis or conjunctival injections. Respiratory: No apparent respiratory distress. Gastrointestinal: Nondistended. Musculoskeletal: No obvious bony deformities. Psychiatric: Cooperative. Appropriate mood and affect.     Diagnostic Study Results     Labs -     Recent Results (from the past 24 hour(s))   URINALYSIS W/ RFLX MICROSCOPIC    Collection Time: 02/21/22  2:55 PM   Result Value Ref Range    Color Yellow      Appearance Clear Clear      Specific gravity 1.015 1.003 - 1.030      pH (UA) 5.0 5.0 - 8.0      Protein Negative Negative mg/dL    Glucose Negative Negative mg/dL    Ketone Negative Negative mg/dL    Bilirubin Negative Negative      Blood Negative Negative      Urobilinogen 0.1 (L) 0.2 - 1.0 EU/dL    Nitrites Negative Negative      Leukocyte Esterase Negative Negative     HCG URINE, QL    Collection Time: 02/21/22  2:55 PM   Result Value Ref Range    HCG urine, QL Negative Negative     TRICHOMONAS RAPID AG    Collection Time: 02/21/22  2:55 PM   Result Value Ref Range    Trichomonas, rapid Ag Negative Negative     KOH, OTHER SOURCES    Collection Time: 02/21/22  2:55 PM    Specimen: Swab; Other   Result Value Ref Range    Special Requests: No Special Requests      KOH No yeast seen         Radiologic Studies -   No orders to display     CT Results  (Last 48 hours)    None        CXR Results  (Last 48 hours)    None          Medical Decision Making and ED Course     I reviewed the available vital signs, nursing notes, past medical history, past surgical history, family history, and social history. Vital Signs - Reviewed the patient's vital signs. Patient Vitals for the past 12 hrs:   Temp Pulse Resp BP SpO2   02/21/22 1450 98.5 °F (36.9 °C) 88 16 131/74 100 %       Medical Decision Making:   Presented with vaginal discomfort. The differential diagnosis is vaginal dryness, STD. Patient has possible exposure to STD. Tests are so far negative however gonorrhea/chlamydia are pending. Will treat empirically with azithromycin and IM Rocephin. Recommended close follow-up with her lab findings. Discharged in good condition. Disposition     Discharged    DISCHARGE PLAN:  1. Current Discharge Medication List      CONTINUE these medications which have NOT CHANGED    Details   valACYclovir (VALTREX) 500 mg tablet valacyclovir 500 mg tablet   TAKE 1 TABLET BY MOUTH EVERY DAY      lisinopriL (PRINIVIL, ZESTRIL) 10 mg tablet Take 10 mg by mouth daily. hydroCHLOROthiazide (HYDRODIURIL) 12.5 mg tablet Take 12.5 mg by mouth daily. PARoxetine (PaxiL) 30 mg tablet Take 30 mg by mouth daily. 2.   Follow-up Information     Follow up With Specialties Details Why Contact Info    [address] [facility] Emergency Medicine Go today As soon as possible if symptoms worsen [address] [place] [id number] [phone]    Primary care doctor  Schedule an appointment as soon as possible for a visit in 3 days          3.   Return to ED if worse     Diagnosis     Clinical impression:   1. Possible exposure to STD           Attestation:  Please note that this dictation was completed with [employer], the computer voice recognition software. Quite often unanticipated grammatical, syntax, homophones, and other interpretive errors are inadvertently transcribed by the computer software. Please disregard these errors. Please excuse any errors that have escaped final proofreading. Thank you.   Kenny Grayson, DO

## 2022-02-24 LAB
C TRACH RRNA SPEC QL NAA+PROBE: NEGATIVE
N GONORRHOEA RRNA SPEC QL NAA+PROBE: NEGATIVE
PLEASE NOTE:, 188601: NORMAL
SPECIMEN SOURCE: NORMAL

## 2022-03-18 PROBLEM — N73.9 PELVIC ABSCESS IN FEMALE: Status: ACTIVE | Noted: 2019-08-31

## 2022-03-19 PROBLEM — N93.9 ABNORMAL UTERINE BLEEDING: Status: ACTIVE | Noted: 2019-08-23

## 2022-11-03 ENCOUNTER — APPOINTMENT (OUTPATIENT)
Dept: GENERAL RADIOLOGY | Age: 47
End: 2022-11-03
Attending: EMERGENCY MEDICINE
Payer: COMMERCIAL

## 2022-11-03 ENCOUNTER — HOSPITAL ENCOUNTER (EMERGENCY)
Age: 47
Discharge: HOME | End: 2022-11-03
Attending: EMERGENCY MEDICINE
Payer: COMMERCIAL

## 2022-11-03 VITALS
WEIGHT: 266.76 LBS | HEART RATE: 67 BPM | TEMPERATURE: 98.9 F | RESPIRATION RATE: 12 BRPM | DIASTOLIC BLOOD PRESSURE: 73 MMHG | SYSTOLIC BLOOD PRESSURE: 125 MMHG | BODY MASS INDEX: 45.54 KG/M2 | OXYGEN SATURATION: 97 % | HEIGHT: 64 IN

## 2022-11-03 DIAGNOSIS — B34.9 VIRAL SYNDROME: Primary | ICD-10-CM

## 2022-11-03 LAB
COVID-19 RAPID TEST, COVR: NOT DETECTED
FLUAV AG NPH QL IA: NEGATIVE
FLUBV AG NOSE QL IA: NEGATIVE
SARS-COV-2, COV2: NORMAL
SOURCE, COVRS: NORMAL

## 2022-11-03 PROCEDURE — 99284 EMERGENCY DEPT VISIT MOD MDM: CPT

## 2022-11-03 PROCEDURE — 87804 INFLUENZA ASSAY W/OPTIC: CPT

## 2022-11-03 PROCEDURE — 74011250637 HC RX REV CODE- 250/637: Performed by: EMERGENCY MEDICINE

## 2022-11-03 PROCEDURE — U0005 INFEC AGEN DETEC AMPLI PROBE: HCPCS

## 2022-11-03 PROCEDURE — 71046 X-RAY EXAM CHEST 2 VIEWS: CPT

## 2022-11-03 PROCEDURE — 87635 SARS-COV-2 COVID-19 AMP PRB: CPT

## 2022-11-03 RX ORDER — ACETAMINOPHEN 500 MG
1000 TABLET ORAL ONCE
Status: COMPLETED | OUTPATIENT
Start: 2022-11-03 | End: 2022-11-03

## 2022-11-03 RX ADMIN — ACETAMINOPHEN 1000 MG: 500 TABLET ORAL at 19:32

## 2022-11-03 NOTE — ED TRIAGE NOTES
41year old female pt comes to the ED via POV for flu like symptoms. Pt states she has had a headache, minor cough, and nasuea for the past 2 days. Pt wants to be tested for flu, covid, and pneumonia. Pt is A&Ox4.

## 2022-11-03 NOTE — Clinical Note
[facility]  [address]  [address]  [phone]    Work/School Note    Date: 11/3/2022     To Whom It May concern:    Terrence Gan was evaluated by the following provider(s):  Attending Provider: Andi Linton MD.   COVID19 virus is suspected. Per the CDC guidelines we recommend home isolation until the following conditions are all met:    1. At least five days have passed since symptoms first appeared and/or had a close exposure,   2. After home isolation for five days, wearing a mask around others for the next five days,  3. At least 24 have passed since last fever without the use of fever-reducing medications and  4.  Symptoms (eg cough, shortness of breath) have improved      Sincerely,          Teddy Paula MD

## 2022-11-03 NOTE — ED PROVIDER NOTES
The history is provided by the patient. No  was used. Headache   This is a new problem. The current episode started 2 days ago. The problem occurs every few hours. The problem has not changed since onset. The headache is aggravated by nothing. The pain is located in the Generalized region. The pain is mild. Associated symptoms include a fever, malaise/fatigue and nausea. Pertinent negatives include no anorexia, no chest pressure, no near-syncope, no orthopnea, no palpitations, no syncope, no shortness of breath, no weakness, no tingling, no dizziness, no visual change and no vomiting. She has tried nothing for the symptoms. The treatment provided no relief. Past Medical History:   Diagnosis Date    Genital herpes     Hypercholesteremia 08/02/2019    Hypertension     Morbid obesity ([address]     Psychiatric disorder     anxiety    Sleep apnea      CPAP compliant       Past Surgical History:   Procedure Laterality Date    HX GYN  2006    C section    HX OTHER SURGICAL Right 1998    cyst removed from right wrist         History reviewed. No pertinent family history. Social History     Socioeconomic History    Marital status:      Spouse name: Not on file    Number of children: Not on file    Years of education: Not on file    Highest education level: Not on file   Occupational History    Not on file   Tobacco Use    Smoking status: Never    Smokeless tobacco: Never   Substance and Sexual Activity    Alcohol use:  Yes     Alcohol/week: 1.0 standard drink     Types: 1 Glasses of wine per week     Comment: 2-3 drinks per month    Drug use: Never    Sexual activity: Not on file   Other Topics Concern    Not on file   Social History Narrative    Not on file     Social Determinants of Health     Financial Resource Strain: Not on file   Food Insecurity: Not on file   Transportation Needs: Not on file   Physical Activity: Not on file   Stress: Not on file   Social Connections: Not on file Intimate Partner Violence: Not on file   Housing Stability: Not on file         ALLERGIES: Patient has no known allergies. Review of Systems   Constitutional:  Positive for chills, fatigue, fever and malaise/fatigue. Negative for activity change. HENT:  Negative for nosebleeds, sore throat, trouble swallowing and voice change. Eyes:  Negative for visual disturbance. Respiratory:  Negative for shortness of breath. Cardiovascular:  Negative for chest pain, palpitations, orthopnea, syncope and near-syncope. Gastrointestinal:  Positive for nausea. Negative for abdominal pain, anorexia, constipation, diarrhea and vomiting. Genitourinary:  Negative for difficulty urinating, dysuria, hematuria and urgency. Musculoskeletal:  Negative for back pain, neck pain and neck stiffness. Skin:  Negative for color change. Allergic/Immunologic: Negative for immunocompromised state. Neurological:  Positive for headaches. Negative for dizziness, tingling, seizures, syncope, weakness, light-headedness and numbness. Psychiatric/Behavioral:  Negative for behavioral problems, confusion, hallucinations, self-injury and suicidal ideas. Vitals:    11/03/22 1740 11/03/22 1854 11/03/22 1906   BP: (!) 146/86 122/75    Pulse: 74     Resp: 12     Temp: 98.9 °F (37.2 °C)     SpO2: 99%  99%   Weight: 121 kg (266 lb 12.1 oz)     Height: 5' 4\" (1.626 m)              Physical Exam  Vitals and nursing note reviewed. Constitutional:       General: She is not in acute distress. Appearance: She is well-developed. She is not diaphoretic. HENT:      Head: Normocephalic and atraumatic. Eyes:      Pupils: Pupils are equal, round, and reactive to light. Cardiovascular:      Rate and Rhythm: Normal rate and regular rhythm. Heart sounds: Normal heart sounds. No murmur heard. No friction rub. No gallop. Pulmonary:      Effort: Pulmonary effort is normal. No respiratory distress.       Breath sounds: Normal breath sounds. No wheezing. Abdominal:      General: Bowel sounds are normal. There is no distension. Palpations: Abdomen is soft. Tenderness: There is no abdominal tenderness. There is no guarding or rebound. Musculoskeletal:         General: Normal range of motion. Cervical back: Normal range of motion and neck supple. Skin:     General: Skin is warm. Findings: No rash. Neurological:      Mental Status: She is alert and oriented to person, place, and time. Psychiatric:         Behavior: Behavior normal.         Thought Content: Thought content normal.         Judgment: Judgment normal.        MDM    This is a 53-year-old female with past medical history, review of systems, physical exam as above, presenting with complaints of 1 to 2 days of mild generalized headache, subjective fevers and chills, nausea. Patient endorses mild nonproductive cough. She states she is vaccinated for COVID, not yet received her vaccine this year for flu. She denies chest pain, shortness of breath, vomiting, diarrhea or constipation, dysuria or hematuria. 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 Physical exam is remarkable for a well-appearing middle-aged female, in no acute distress noted to be normotensive, afebrile without tachycardia, satting well on room air. She has an unremarkable posterior pharynx, clear breath sounds to auscultation, regular rate and rhythm without murmurs gallops rubs. Discussed with patient likely viral syndrome, differential including COVID and flu, low likelihood for both, however patient requesting PCR for flu. Will provide Tylenol, send PCR, advised primary care follow-up, return precautions given.     Procedures

## 2022-11-04 LAB
SARS-COV-2, XPLCVT: NOT DETECTED
SOURCE, COVRS: NORMAL

## 2023-05-05 ENCOUNTER — HOSPITAL ENCOUNTER (EMERGENCY)
Age: 48
Discharge: HOME | End: 2023-05-05
Attending: STUDENT IN AN ORGANIZED HEALTH CARE EDUCATION/TRAINING PROGRAM
Payer: COMMERCIAL

## 2023-05-05 VITALS
WEIGHT: 265 LBS | SYSTOLIC BLOOD PRESSURE: 127 MMHG | TEMPERATURE: 98.7 F | RESPIRATION RATE: 16 BRPM | OXYGEN SATURATION: 97 % | DIASTOLIC BLOOD PRESSURE: 76 MMHG | HEART RATE: 87 BPM | HEIGHT: 64 IN | BODY MASS INDEX: 45.24 KG/M2

## 2023-05-05 DIAGNOSIS — B37.31 YEAST INFECTION OF THE VAGINA: Primary | ICD-10-CM

## 2023-05-05 LAB
APPEARANCE UR: CLEAR
BACTERIA URNS QL MICRO: NEGATIVE /HPF
BILIRUB UR QL: NEGATIVE
COLOR UR: YELLOW
EPITH CASTS URNS QL MICRO: ABNORMAL /LPF
GLUCOSE UR STRIP.AUTO-MCNC: NEGATIVE MG/DL
HGB UR QL STRIP: NEGATIVE
KETONES UR QL STRIP.AUTO: NEGATIVE MG/DL
LEUKOCYTE ESTERASE UR QL STRIP.AUTO: NEGATIVE
NITRITE UR QL STRIP.AUTO: NEGATIVE
PH UR STRIP: 5 (ref 5–8)
PROT UR STRIP-MCNC: NEGATIVE MG/DL
RBC #/AREA URNS HPF: ABNORMAL /HPF (ref 0–5)
SP GR UR REFRACTOMETRY: 1.01 (ref 1–1.03)
UROBILINOGEN UR QL STRIP.AUTO: 0.1 EU/DL (ref 0.2–1)
WBC URNS QL MICRO: ABNORMAL /HPF (ref 0–4)

## 2023-05-05 PROCEDURE — 81001 URINALYSIS AUTO W/SCOPE: CPT

## 2023-05-05 PROCEDURE — 99283 EMERGENCY DEPT VISIT LOW MDM: CPT

## 2023-05-05 RX ORDER — FLUCONAZOLE 150 MG/1
150 TABLET ORAL
Qty: 2 TABLET | Refills: 0 | Status: SHIPPED | OUTPATIENT
Start: 2023-05-05 | End: 2023-05-05

## 2023-05-18 RX ORDER — LISINOPRIL 10 MG/1
10 TABLET ORAL DAILY
COMMUNITY

## 2023-05-18 RX ORDER — HYDROCHLOROTHIAZIDE 12.5 MG/1
12.5 TABLET ORAL DAILY
COMMUNITY

## 2023-05-18 RX ORDER — VALACYCLOVIR HYDROCHLORIDE 500 MG/1
TABLET, FILM COATED ORAL
COMMUNITY

## 2023-05-18 RX ORDER — PAROXETINE 30 MG/1
30 TABLET, FILM COATED ORAL DAILY
COMMUNITY

## 2023-11-28 ENCOUNTER — HOSPITAL ENCOUNTER (EMERGENCY)
Facility: HOSPITAL | Age: 48
Discharge: HOME | End: 2023-11-28
Payer: COMMERCIAL

## 2023-11-28 VITALS
OXYGEN SATURATION: 98 % | RESPIRATION RATE: 19 BRPM | HEART RATE: 82 BPM | BODY MASS INDEX: 46.1 KG/M2 | WEIGHT: 270 LBS | SYSTOLIC BLOOD PRESSURE: 136 MMHG | TEMPERATURE: 98.7 F | HEIGHT: 64 IN | DIASTOLIC BLOOD PRESSURE: 85 MMHG

## 2023-11-28 DIAGNOSIS — J06.9 VIRAL UPPER RESPIRATORY TRACT INFECTION: Primary | ICD-10-CM

## 2023-11-28 PROCEDURE — 99283 EMERGENCY DEPT VISIT LOW MDM: CPT

## 2023-11-28 RX ORDER — GUAIFENESIN 600 MG/1
600 TABLET, EXTENDED RELEASE ORAL 2 TIMES DAILY
Qty: 30 TABLET | Refills: 0 | Status: SHIPPED | OUTPATIENT
Start: 2023-11-28 | End: 2023-12-13

## 2023-11-28 RX ORDER — LORATADINE 10 MG/1
10 TABLET ORAL DAILY
Qty: 30 TABLET | Refills: 0 | Status: SHIPPED | OUTPATIENT
Start: 2023-11-28

## 2023-11-28 ASSESSMENT — PAIN - FUNCTIONAL ASSESSMENT: PAIN_FUNCTIONAL_ASSESSMENT: 0-10

## 2023-11-28 ASSESSMENT — PAIN SCALES - GENERAL: PAINLEVEL_OUTOF10: 6

## 2023-11-28 NOTE — ED PROVIDER NOTES
[facility] EMERGENCY DEPT  EMERGENCY DEPARTMENT HISTORY AND PHYSICAL EXAM      Date: 11/28/2023  Patient Name: Carroll Roman  MRN: [id number]  [address] [facility] [place] 1975  Date of evaluation: 11/28/2023  Provider: Abdulaziz Lord PA-C   Note Started: 10:04 AM EST 11/28/23    HISTORY OF PRESENT ILLNESS     Chief Complaint   Patient presents with    URI       History Provided By: Patient    HPI: Carroll Roman is a 57 y.o. female with past medical history listed below, presents for nasal congestion x3 days with occasional nose bleeds due to dryness. She tried multiple over-the-counter medications including Tylenol sinus severe, [place] plus severe, and NyQuil with some relief of symptoms. Patient endorses sinus fullness and tenderness. She denies any fevers, chills, cough, sore throat. Denies any shortness of breath, difficulty breathing, nausea/vomiting, constipation/diarrhea, abdominal pain, rash, night sweats, or chest pain. PAST MEDICAL HISTORY   Past Medical History:  Past Medical History:   Diagnosis Date    Genital herpes     Hypercholesteremia 08/02/2019    Hypercholesterolemia     Hypertension     Morbid obesity ([address])     Psychiatric disorder     anxiety    Sleep apnea      CPAP compliant       Past Surgical History:  Past Surgical History:   Procedure Laterality Date    GYN  2006    C section    OTHER SURGICAL HISTORY Right 1998    cyst removed from right wrist       Family History:  History reviewed. No pertinent family history. Social History:  Social History     Tobacco Use    Smoking status: Never    Smokeless tobacco: Never   Substance Use Topics    Alcohol use:  Yes     Alcohol/week: 1.0 standard drink of alcohol     Types: 1 Standard drinks or equivalent per week     Comment: ocasionally    Drug use: Yes     Types: Marijuana [place])     Comment: ocasional marijuana       Allergies:  No Known Allergies    PCP: None, None    Current Meds:   No current facility-administered medications for this

## 2023-11-28 NOTE — ED TRIAGE NOTES
Pt c/o URI sx. Sx include: nasal Congestion, occasional nose bleed. Uses a cpap at home and thinks she may have sinus or resp infection.

## 2024-05-06 ENCOUNTER — APPOINTMENT (OUTPATIENT)
Facility: HOSPITAL | Age: 49
End: 2024-05-06
Payer: MEDICAID

## 2024-05-06 ENCOUNTER — HOSPITAL ENCOUNTER (EMERGENCY)
Facility: HOSPITAL | Age: 49
Discharge: HOME | End: 2024-05-06
Payer: MEDICAID

## 2024-05-06 VITALS
HEIGHT: 64 IN | WEIGHT: 270 LBS | TEMPERATURE: 98.1 F | BODY MASS INDEX: 46.1 KG/M2 | SYSTOLIC BLOOD PRESSURE: 144 MMHG | RESPIRATION RATE: 18 BRPM | OXYGEN SATURATION: 100 % | HEART RATE: 70 BPM | DIASTOLIC BLOOD PRESSURE: 92 MMHG

## 2024-05-06 DIAGNOSIS — G44.319 ACUTE POST-TRAUMATIC HEADACHE, NOT INTRACTABLE: ICD-10-CM

## 2024-05-06 DIAGNOSIS — S09.90XA CLOSED HEAD INJURY, INITIAL ENCOUNTER: Primary | ICD-10-CM

## 2024-05-06 LAB — HCG UR QL: NEGATIVE

## 2024-05-06 PROCEDURE — 70450 CT HEAD/BRAIN W/O DYE: CPT

## 2024-05-06 PROCEDURE — 81025 URINE PREGNANCY TEST: CPT

## 2024-05-06 PROCEDURE — 99284 EMERGENCY DEPT VISIT MOD MDM: CPT

## 2024-05-06 RX ORDER — METHOCARBAMOL 750 MG/1
750 TABLET, FILM COATED ORAL 4 TIMES DAILY
Qty: 40 TABLET | Refills: 0 | Status: SHIPPED | OUTPATIENT
Start: 2024-05-06 | End: 2024-05-16

## 2024-05-06 ASSESSMENT — PAIN DESCRIPTION - LOCATION: LOCATION: HEAD

## 2024-05-06 ASSESSMENT — PAIN - FUNCTIONAL ASSESSMENT: PAIN_FUNCTIONAL_ASSESSMENT: 0-10

## 2024-05-06 NOTE — ED PROVIDER NOTES
Allergies:  No Known Allergies    PCP: None, None (Inactive)    Current Meds:   No current facility-administered medications for this encounter.     Current Outpatient Medications   Medication Sig Dispense Refill    methocarbamol (ROBAXIN-750) 750 MG tablet Take 1 tablet by mouth 4 times daily for 10 days 40 tablet 0    loratadine (CLARITIN) 10 MG tablet Take 1 tablet by mouth daily 30 tablet 0    hydroCHLOROthiazide (HYDRODIURIL) 12.5 MG tablet Take 1 tablet by mouth daily      lisinopril (PRINIVIL;ZESTRIL) 10 MG tablet Take 1 tablet by mouth daily      PARoxetine (PAXIL) 30 MG tablet Take 1 tablet by mouth daily      valACYclovir (VALTREX) 500 MG tablet valacyclovir 500 mg tablet   TAKE 1 TABLET BY MOUTH EVERY DAY         Social Determinants of Health:   Social Determinants of Health     Tobacco Use: Low Risk  (5/6/2024)    Patient History     Smoking Tobacco Use: Never     Smokeless Tobacco Use: Never     Passive Exposure: Not on file   Alcohol Use: Not on file   Financial Resource Strain: Not on file   Food Insecurity: Not on file   Transportation Needs: Not on file   Physical Activity: Not on file   Stress: Not on file   Social Connections: Not on file   Intimate Partner Violence: Not on file   Depression: Not on file   Housing Stability: Not on file   Interpersonal Safety: Not At Risk (5/6/2024)    Interpersonal Safety Domain Source: IP Abuse Screening     Physical abuse: Denies     Verbal abuse: Denies     Emotional abuse: Denies     Financial abuse: Denies     Sexual abuse: Denies   Utilities: Not on file       PHYSICAL EXAM   Physical Exam  Vitals and nursing note reviewed.   Constitutional:       General: She is not in acute distress.     Appearance: Normal appearance.   HENT:      Head: Normocephalic and atraumatic. No raccoon eyes or Evans's sign.      Jaw: There is normal jaw occlusion.      Nose: Nose normal.      Mouth/Throat:      Mouth: Mucous membranes are moist.   Eyes:      Extraocular

## 2024-05-06 NOTE — DISCHARGE INSTRUCTIONS
Thank you!  Thank you for allowing me to care for you in the emergency department. It is my goal to provide you with excellent care.  Please fill out the survey that will come to you by mail or email since we listen to your feedback!     Below you will find a list of your tests from today's visit.  Should you have any questions, please do not hesitate to call the emergency department.    Labs  Recent Results (from the past 12 hour(s))   POC Pregnancy Urine Qual    Collection Time: 05/06/24  6:15 PM   Result Value Ref Range    Preg Test, Ur Negative Negative         Radiologic Studies  CT HEAD WO CONTRAST   Final Result   No evidence of acute intracranial abnormality.              ------------------------------------------------------------------------------------------------------------  The exam and treatment you received in the Emergency Department were for an urgent problem and are not intended as complete care. It is important that you follow-up with a doctor, nurse practitioner, or physician assistant to:  (1) confirm your diagnosis,  (2) re-evaluation of changes in your illness and treatment, and (3) for ongoing care. Please take your discharge instructions with you when you go to your follow-up appointment.     If you have any problem arranging a follow-up appointment, contact the Emergency Department.  If your symptoms become worse or you do not improve as expected and you are unable to reach your health care provider, please return to the Emergency Department. We are available 24 hours a day.     If a prescription has been provided, please have it filled as soon as possible to prevent a delay in treatment. If you have any questions or reservations about taking the medication due to side effects or interactions with other medications, please call your primary care provider or contact the ER.

## 2024-05-06 NOTE — ED TRIAGE NOTES
Pt c/o headaches and possible concussion s/p fall injury; pt fell from a barstool backwards hitting head, back, and left shoulder; PCP referred pt to ED for Head CT    Last BC Powder 3hrs pta

## 2024-09-20 ENCOUNTER — OFFICE VISIT (OUTPATIENT)
Age: 49
End: 2024-09-20
Payer: MEDICARE

## 2024-09-20 VITALS
HEIGHT: 64 IN | WEIGHT: 264 LBS | DIASTOLIC BLOOD PRESSURE: 82 MMHG | BODY MASS INDEX: 45.07 KG/M2 | HEART RATE: 83 BPM | SYSTOLIC BLOOD PRESSURE: 133 MMHG | OXYGEN SATURATION: 98 %

## 2024-09-20 DIAGNOSIS — F32.A ANXIETY AND DEPRESSION: ICD-10-CM

## 2024-09-20 DIAGNOSIS — F41.9 ANXIETY AND DEPRESSION: ICD-10-CM

## 2024-09-20 DIAGNOSIS — G47.33 OSA (OBSTRUCTIVE SLEEP APNEA): Primary | ICD-10-CM

## 2024-09-20 DIAGNOSIS — I10 PRIMARY HYPERTENSION: ICD-10-CM

## 2024-09-20 PROCEDURE — 3079F DIAST BP 80-89 MM HG: CPT | Performed by: INTERNAL MEDICINE

## 2024-09-20 PROCEDURE — 3075F SYST BP GE 130 - 139MM HG: CPT | Performed by: INTERNAL MEDICINE

## 2024-09-20 PROCEDURE — 99204 OFFICE O/P NEW MOD 45 MIN: CPT | Performed by: INTERNAL MEDICINE

## 2024-09-20 RX ORDER — TAMSULOSIN HYDROCHLORIDE 0.4 MG/1
0.4 CAPSULE ORAL DAILY
COMMUNITY

## 2024-09-20 ASSESSMENT — SLEEP AND FATIGUE QUESTIONNAIRES
ESS TOTAL SCORE: 3
HOW LIKELY ARE YOU TO NOD OFF OR FALL ASLEEP WHILE SITTING AND READING: SLIGHT CHANCE OF DOZING
HOW LIKELY ARE YOU TO NOD OFF OR FALL ASLEEP WHILE WATCHING TV: MODERATE CHANCE OF DOZING
HOW LIKELY ARE YOU TO NOD OFF OR FALL ASLEEP WHEN YOU ARE A PASSENGER IN A CAR FOR AN HOUR WITHOUT A BREAK: WOULD NEVER DOZE
HOW LIKELY ARE YOU TO NOD OFF OR FALL ASLEEP WHILE SITTING INACTIVE IN A PUBLIC PLACE: WOULD NEVER DOZE
HOW LIKELY ARE YOU TO NOD OFF OR FALL ASLEEP IN A CAR, WHILE STOPPED FOR A FEW MINUTES IN TRAFFIC: WOULD NEVER DOZE
HOW LIKELY ARE YOU TO NOD OFF OR FALL ASLEEP WHILE SITTING AND TALKING TO SOMEONE: WOULD NEVER DOZE
HOW LIKELY ARE YOU TO NOD OFF OR FALL ASLEEP WHILE LYING DOWN TO REST IN THE AFTERNOON WHEN CIRCUMSTANCES PERMIT: WOULD NEVER DOZE
HOW LIKELY ARE YOU TO NOD OFF OR FALL ASLEEP WHILE SITTING QUIETLY AFTER LUNCH WITHOUT ALCOHOL: WOULD NEVER DOZE

## 2024-10-14 ENCOUNTER — TELEPHONE (OUTPATIENT)
Age: 49
End: 2024-10-14

## 2024-10-14 DIAGNOSIS — G47.33 OSA (OBSTRUCTIVE SLEEP APNEA): Primary | ICD-10-CM

## 2024-10-14 NOTE — TELEPHONE ENCOUNTER
Attended study denied and cancelled. I talked to patient about previous experience with HST and I believe if we can perform HST from our facility and not [facility] we can get better results. Will try to see Auth for HST.

## 2024-10-14 NOTE — TELEPHONE ENCOUNTER
Requested precert for Home Sleep Test in case provider agrees.    Requested: [id number]  Facility: [facility]  Faxed clinicals to: [phone]  STATUS:  Pending

## 2024-10-14 NOTE — TELEPHONE ENCOUNTER
Encounter Diagnosis   Name Primary?    YOGESH (obstructive sleep apnea) Yes         Orders Placed This Encounter   Procedures    PAT - Home Sleep Test     Standing Status:   Future     Standing Expiration Date:   4/14/2025     Order Specific Question:   Location For Sleep Study     Answer:   [place]

## 2025-03-09 ENCOUNTER — HOSPITAL ENCOUNTER (EMERGENCY)
Facility: HOSPITAL | Age: 50
Discharge: HOME | End: 2025-03-09
Payer: MEDICAID

## 2025-03-09 VITALS
HEART RATE: 76 BPM | BODY MASS INDEX: 46.1 KG/M2 | OXYGEN SATURATION: 98 % | HEIGHT: 64 IN | TEMPERATURE: 98.6 F | RESPIRATION RATE: 15 BRPM | WEIGHT: 270 LBS | DIASTOLIC BLOOD PRESSURE: 90 MMHG | SYSTOLIC BLOOD PRESSURE: 145 MMHG

## 2025-03-09 DIAGNOSIS — U07.1 COVID-19: ICD-10-CM

## 2025-03-09 DIAGNOSIS — J06.9 VIRAL URI WITH COUGH: Primary | ICD-10-CM

## 2025-03-09 PROCEDURE — 99283 EMERGENCY DEPT VISIT LOW MDM: CPT

## 2025-03-09 RX ORDER — IBUPROFEN 600 MG/1
600 TABLET, FILM COATED ORAL 3 TIMES DAILY PRN
Qty: 30 TABLET | Refills: 0 | Status: SHIPPED | OUTPATIENT
Start: 2025-03-09

## 2025-03-09 RX ORDER — DEXTROMETHORPHAN HYDROBROMIDE AND PROMETHAZINE HYDROCHLORIDE 15; 6.25 MG/5ML; MG/5ML
5 SYRUP ORAL 4 TIMES DAILY PRN
Qty: 118 ML | Refills: 0 | Status: SHIPPED | OUTPATIENT
Start: 2025-03-09

## 2025-03-09 RX ORDER — ACETAMINOPHEN 500 MG
1000 TABLET ORAL 3 TIMES DAILY PRN
Qty: 30 TABLET | Refills: 0 | Status: SHIPPED | OUTPATIENT
Start: 2025-03-09 | End: 2025-03-19

## 2025-03-09 RX ORDER — ATORVASTATIN CALCIUM 20 MG/1
20 TABLET, FILM COATED ORAL DAILY
COMMUNITY

## 2025-03-09 ASSESSMENT — PAIN - FUNCTIONAL ASSESSMENT: PAIN_FUNCTIONAL_ASSESSMENT: 0-10

## 2025-03-09 ASSESSMENT — PAIN SCALES - GENERAL: PAINLEVEL_OUTOF10: 0

## 2025-03-09 NOTE — DISCHARGE INSTRUCTIONS
Thank you for choosing our Emergency Department for your care.  It is our privilege to care for you in your time of need.  In the next several days, you may receive a survey via email or mailed to your home about your experience with our team.  We would greatly appreciate you taking a few minutes to complete the survey, as we use this information to learn what we have done well and what we could be doing better. Thank you for trusting us with your care!    Below you will find a list of your tests from today's visit.   Labs and Radiology Studies  No results found for this or any previous visit (from the past 12 hours).  No results found.  ------------------------------------------------------------------------------------------------------------  The evaluation and treatment you received in the Emergency Department were for an urgent problem. It is important that you follow-up with a doctor, nurse practitioner, or physician assistant to:  (1) confirm your diagnosis,  (2) re-evaluation of changes in your illness and treatment, and (3) for ongoing care. Please take your discharge instructions with you when you go to your follow-up appointment.     If you have any problem arranging a follow-up appointment, contact us!  If your symptoms become worse or you do not improve as expected, please return to us. We are available 24 hours a day.     If a prescription has been provided, please fill it as soon as possible to prevent a delay in treatment. If you have any questions or reservations about taking the medication due to side effects or interactions with other medications, please call your primary care provider or contact us directly.  Again, THANK YOU for choosing us to care for YOU!

## 2025-03-09 NOTE — ED PROVIDER NOTES
[facility] EMERGENCY DEPT  EMERGENCY DEPARTMENT HISTORY AND PHYSICAL EXAM      Date: 3/9/2025  Patient Name: Guerita Ozuna  MRN: [id number]  YOB: 1975  Date of evaluation: 3/9/2025  Provider: Kareem Becker PA-C   Note Started: 1:04 PM EDT 3/9/25    HISTORY OF PRESENT ILLNESS     Chief Complaint   Patient presents with    Letter for School/Work       History Provided By: Patient    HPI: Guerita Ozuna is a 50 y.o. female with past medical history is low presents emergency department for evaluation of subjective fevers chills night sweats, cough, nasal congestion, sore throat.  Patient reports that she took a home COVID test which came back positive reports that she has been treating symptoms with over-the-counter medications but need to come to have medical evaluation and a work note.  Denies any chest pain shortness breath difficulty breathing abdominal pain nausea vomiting diarrhea    PAST MEDICAL HISTORY   Past Medical History:  Past Medical History:   Diagnosis Date    Genital herpes     Hypercholesteremia 08/02/2019    Hypercholesterolemia     Hypertension     Morbid obesity     Psychiatric disorder     anxiety    Sleep apnea      CPAP compliant       Past Surgical History:  Past Surgical History:   Procedure Laterality Date    GYN  2006    C section    OTHER SURGICAL HISTORY Right 1998    cyst removed from right wrist       Family History:  No family history on file.    Social History:  Social History     Tobacco Use    Smoking status: Never    Smokeless tobacco: Never   Substance Use Topics    Alcohol use: Yes     Alcohol/week: 1.0 standard drink of alcohol     Types: 1 Standard drinks or equivalent per week     Comment: ocasionally    Drug use: Yes     Types: Marijuana ([place])     Comment: ocasional marijuana       Allergies:  No Known Allergies    PCP: None, None    Current Meds:   No current facility-administered medications for this encounter.     Current Outpatient Medications   Medication Sig

## (undated) DEVICE — SUT CHRMC 3-0 27IN SH BRN --

## (undated) DEVICE — YANKAUER,TAPERED BULBOUS TIP,W/O VENT: Brand: MEDLINE

## (undated) DEVICE — SUTURE VCRL SZ 0 L18IN ABSRB VLT L40MM CT 1/2 CIR J752D

## (undated) DEVICE — CANISTER, RIGID, 3000CC: Brand: MEDLINE INDUSTRIES, INC.

## (undated) DEVICE — GOWN,SIRUS,FABRNF,XL,20/CS: Brand: MEDLINE

## (undated) DEVICE — SUTURE PLN GUT SZ 2-0 L27IN ABSRB YELLOWISH TAN L70MM XLH 53T

## (undated) DEVICE — STRIP,CLOSURE,WOUND,MEDI-STRIP,1/2X4: Brand: MEDLINE

## (undated) DEVICE — SURGICAL PROCEDURE PACK GYN LAPAROSCOPY CUST SMH LF

## (undated) DEVICE — TOWEL SURG W17XL27IN STD BLU COT NONFENESTRATED PREWASHED

## (undated) DEVICE — DRAPE,REIN 53X77,STERILE: Brand: MEDLINE

## (undated) DEVICE — STERILE POLYISOPRENE POWDER-FREE SURGICAL GLOVES WITH EMOLLIENT COATING: Brand: PROTEXIS

## (undated) DEVICE — COVER LT HNDL PLAS RIG 1 PER PK

## (undated) DEVICE — SUTURE MCRYL SZ 3-0 L27IN ABSRB UD L24MM PS-1 3/8 CIR PRIM Y936H

## (undated) DEVICE — MASTISOL ADHESIVE LIQ 2/3ML

## (undated) DEVICE — SUTURE PDS II SZ 0 L27IN ABSRB VLT L36MM CT-1 1/2 CIR Z340H

## (undated) DEVICE — TRAY PREP DRY W/ PREM GLV 2 APPL 6 SPNG 2 UNDPD 1 OVERWRAP

## (undated) DEVICE — INTENDED FOR TISSUE SEPARATION, AND OTHER PROCEDURES THAT REQUIRE A SHARP SURGICAL BLADE TO PUNCTURE OR CUT.: Brand: BARD-PARKER ® CARBON RIB-BACK BLADES

## (undated) DEVICE — SOLUTION IV 1000ML 0.9% SOD CHL

## (undated) DEVICE — Z INACTIVE USE 2527070 DRAPE SURG W40XL44IN UNDERBUTTOCK SMS POLYPR W/ PCH BK DISP

## (undated) DEVICE — STERILE POLYISOPRENE POWDER-FREE SURGICAL GLOVES: Brand: PROTEXIS

## (undated) DEVICE — STRAP,POSITIONING,KNEE/BODY,FOAM,4X60": Brand: MEDLINE

## (undated) DEVICE — SUTURE VCRL SZ 0 L36IN ABSRB UD L36MM CT-1 1/2 CIR J946H

## (undated) DEVICE — REM POLYHESIVE ADULT PATIENT RETURN ELECTRODE: Brand: VALLEYLAB

## (undated) DEVICE — SUTURE PLN GUT SZ 3-0 L27IN ABSRB YELLOWISH TAN L40MM CT 852H

## (undated) DEVICE — TELFA ADHESIVE ISLAND DRESSING: Brand: TELFA

## (undated) DEVICE — TRAY CATH OD16FR SIL URIN M STATLOK STBL DEV SURSTP

## (undated) DEVICE — SUTURE VCRL SZ 1 L36IN ABSRB UD L36MM CT-1 1/2 CIR J947H

## (undated) DEVICE — SUTURE VCRL SZ 2-0 L27IN ABSRB UD L26MM SH 1/2 CIR J417H

## (undated) DEVICE — SUTURE PDS II SZ 0 L60IN ABSRB VLT L48MM CTX 1/2 CIR Z990G

## (undated) DEVICE — PREP PAD BNS: Brand: CONVERTORS

## (undated) DEVICE — SPONGE GZ W4XL4IN COT 12 PLY TYP VII WVN C FLD DSGN

## (undated) DEVICE — LARGE, DISPOSABLE ALEXIS O C-SECTION PROTECTOR - RETRACTOR: Brand: ALEXIS ® O C-SECTION PROTECTOR - RETRACTOR

## (undated) DEVICE — SPONGE LAP 18X18IN STRL -- 5/PK

## (undated) DEVICE — ELECTRODE BLDE L4IN NONINSULATED EDGE

## (undated) DEVICE — TIP CLEANER: Brand: VALLEYLAB

## (undated) DEVICE — INFECTION CONTROL KIT SYS

## (undated) DEVICE — DRAPE FLD WRM W44XL66IN C6L FOR INTRATEMP SYS THERMABASIN

## (undated) DEVICE — STAPLER SKIN 35CT WD STRL DISP -- MULTIFIRE PREMIUM

## (undated) DEVICE — AGENT HEMSTAT 5GM ARISTA AH

## (undated) DEVICE — TUBING, SUCTION, 1/4" X 10', STRAIGHT: Brand: MEDLINE

## (undated) DEVICE — (D)PREP SKN CHLRAPRP APPL 26ML -- CONVERT TO ITEM 371833